# Patient Record
Sex: MALE | Race: ASIAN | NOT HISPANIC OR LATINO | Employment: STUDENT | URBAN - METROPOLITAN AREA
[De-identification: names, ages, dates, MRNs, and addresses within clinical notes are randomized per-mention and may not be internally consistent; named-entity substitution may affect disease eponyms.]

---
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
## 2022-01-04 ENCOUNTER — TELEPHONE (OUTPATIENT)
Dept: TRANSPLANT | Facility: CLINIC | Age: 22
End: 2022-01-04

## 2022-01-04 DIAGNOSIS — Z00.5 TRANSPLANT DONOR EVALUATION: Primary | ICD-10-CM

## 2022-01-04 RX ORDER — ALBUTEROL SULFATE 0.83 MG/ML
2.5 SOLUTION RESPIRATORY (INHALATION)
Status: CANCELLED | OUTPATIENT
Start: 2022-01-21

## 2022-01-04 RX ORDER — NALOXONE HYDROCHLORIDE 0.4 MG/ML
0.2 INJECTION, SOLUTION INTRAMUSCULAR; INTRAVENOUS; SUBCUTANEOUS
Status: CANCELLED | OUTPATIENT
Start: 2022-01-21

## 2022-01-04 RX ORDER — EPINEPHRINE 1 MG/ML
0.3 INJECTION, SOLUTION, CONCENTRATE INTRAVENOUS EVERY 5 MIN PRN
Status: CANCELLED | OUTPATIENT
Start: 2022-01-21

## 2022-01-04 RX ORDER — METHYLPREDNISOLONE SODIUM SUCCINATE 125 MG/2ML
125 INJECTION, POWDER, LYOPHILIZED, FOR SOLUTION INTRAMUSCULAR; INTRAVENOUS
Status: CANCELLED
Start: 2022-01-21

## 2022-01-04 RX ORDER — DIPHENHYDRAMINE HYDROCHLORIDE 50 MG/ML
50 INJECTION INTRAMUSCULAR; INTRAVENOUS
Status: CANCELLED
Start: 2022-01-21

## 2022-01-04 RX ORDER — ALBUTEROL SULFATE 90 UG/1
1-2 AEROSOL, METERED RESPIRATORY (INHALATION)
Status: CANCELLED
Start: 2022-01-21

## 2022-01-04 RX ORDER — MEPERIDINE HYDROCHLORIDE 25 MG/ML
25 INJECTION INTRAMUSCULAR; INTRAVENOUS; SUBCUTANEOUS EVERY 30 MIN PRN
Status: CANCELLED | OUTPATIENT
Start: 2022-01-21

## 2022-01-04 NOTE — TELEPHONE ENCOUNTER
Contacted Griffin Flores to introduce myself and my role, review of medical/surgical/family history and next steps.     Griffin Flores  is aware He can stop donor evaluation at any time.    Have you ever been positive for COVID 19? Feb 2021-cough, loss of taste    Have you received the COVID vaccination? yes If yes, when and what brand? Pfizer     Griffin Flores is a 21 year old female  ABO unknown that would like to learn more about donation to his father.     Concerns from medical/surgical/family history: none  WILL NEED NKR KIT AT [facility]    Reviewed any history of travel in endemic areas:   Strongyloides- [place], [place] and [place].  Trypanosoma cruzi ([place])- [place]  West Nile Virus- [place], [place], [place], [place] and [place].     Per our Phase 1 algorithm, does not meet criteria to do preliminary testing.    Reviewed preliminary lab tests.     Reviewed evaluation testing: Covid PCR, Iohexol, Lab work, CXR, EKG, Provider visits and functions, CT Angiogram.     Reviewed operations of selection committee and angio review meetings and the need for multidisciplinary input.     Reviewed NKR listing and transfer of care to [facility] team if approved. Provided [facility] with NKR website to review.     Briefly went over options if approved of NDD, SVP and FVP.     [facility] would like to proceed to evaluation on 1/21/22 if possible with Iohexol on 1/21/22 and COVID PCR prior to Iohexol.     Confirmed that [facility] reviewed Informed consent document and all questions answered.  Reviewed that they will receive [employer] to obtain electronic signature for the following: Informed consent, SRTR data, ALON for medical information, Auth for Electronic communication and will need their signed consent back before proceeding with evaluation.      Encouraged sign up for [employer] and confirmed [facility] sign up.    Verified recipient status if not NDD.    Donor timeline: TBD     Will send orders to  scheduling team to set up for evaluation testing.

## 2022-01-05 ENCOUNTER — DOCUMENTATION ONLY (OUTPATIENT)
Dept: HEALTH INFORMATION MANAGEMENT | Facility: CLINIC | Age: 22
End: 2022-01-05

## 2022-01-13 DIAGNOSIS — Z00.5 TRANSPLANT DONOR EVALUATION: Primary | ICD-10-CM

## 2022-01-20 DIAGNOSIS — Z00.5 TRANSPLANT DONOR EVALUATION: ICD-10-CM

## 2022-01-21 ENCOUNTER — ALLIED HEALTH/NURSE VISIT (OUTPATIENT)
Dept: TRANSPLANT | Facility: CLINIC | Age: 22
End: 2022-01-21
Attending: TRANSPLANT SURGERY

## 2022-01-21 ENCOUNTER — APPOINTMENT (OUTPATIENT)
Dept: TRANSPLANT | Facility: CLINIC | Age: 22
End: 2022-01-21
Attending: TRANSPLANT SURGERY

## 2022-01-21 ENCOUNTER — ANCILLARY PROCEDURE (OUTPATIENT)
Dept: CT IMAGING | Facility: CLINIC | Age: 22
End: 2022-01-21
Attending: INTERNAL MEDICINE

## 2022-01-21 ENCOUNTER — ANCILLARY PROCEDURE (OUTPATIENT)
Dept: GENERAL RADIOLOGY | Facility: CLINIC | Age: 22
End: 2022-01-21
Attending: INTERNAL MEDICINE

## 2022-01-21 ENCOUNTER — OFFICE VISIT (OUTPATIENT)
Dept: TRANSPLANT | Facility: CLINIC | Age: 22
End: 2022-01-21

## 2022-01-21 ENCOUNTER — LAB (OUTPATIENT)
Dept: LAB | Facility: CLINIC | Age: 22
End: 2022-01-21
Attending: INTERNAL MEDICINE

## 2022-01-21 ENCOUNTER — OFFICE VISIT (OUTPATIENT)
Dept: TRANSPLANT | Facility: CLINIC | Age: 22
End: 2022-01-21
Attending: SURGERY

## 2022-01-21 ENCOUNTER — OFFICE VISIT (OUTPATIENT)
Dept: INFUSION THERAPY | Facility: CLINIC | Age: 22
End: 2022-01-21
Attending: INTERNAL MEDICINE

## 2022-01-21 ENCOUNTER — OFFICE VISIT (OUTPATIENT)
Dept: NEPHROLOGY | Facility: CLINIC | Age: 22
End: 2022-01-21
Attending: TRANSPLANT SURGERY

## 2022-01-21 VITALS
OXYGEN SATURATION: 100 % | HEART RATE: 63 BPM | HEIGHT: 72 IN | DIASTOLIC BLOOD PRESSURE: 84 MMHG | SYSTOLIC BLOOD PRESSURE: 129 MMHG | WEIGHT: 201.4 LBS | BODY MASS INDEX: 27.28 KG/M2

## 2022-01-21 VITALS — HEIGHT: 72 IN | WEIGHT: 201.4 LBS | BODY MASS INDEX: 27.28 KG/M2

## 2022-01-21 DIAGNOSIS — Z00.5 TRANSPLANT DONOR EVALUATION: ICD-10-CM

## 2022-01-21 DIAGNOSIS — Z00.5 TRANSPLANT DONOR EVALUATION: Primary | ICD-10-CM

## 2022-01-21 LAB
A1 AG RBC QL: POSITIVE
ABO/RH(D): NORMAL
ABO/RH(D): NORMAL
ALBUMIN SERPL-MCNC: 4.2 G/DL (ref 3.4–5)
ALBUMIN UR-MCNC: NEGATIVE MG/DL
ALP SERPL-CCNC: 36 U/L (ref 65–260)
ALT SERPL W P-5'-P-CCNC: 44 U/L (ref 0–50)
ANION GAP SERPL CALCULATED.3IONS-SCNC: 6 MMOL/L (ref 3–14)
ANTIBODY SCREEN: NEGATIVE
ANTIBODY SCREEN: NEGATIVE
APPEARANCE UR: CLEAR
APTT PPP: 33 SECONDS (ref 22–38)
AST SERPL W P-5'-P-CCNC: 14 U/L (ref 0–35)
BILIRUB SERPL-MCNC: 0.3 MG/DL (ref 0.2–1.3)
BILIRUB UR QL STRIP: NEGATIVE
BUN SERPL-MCNC: 12 MG/DL (ref 7–30)
CALCIUM SERPL-MCNC: 9.4 MG/DL (ref 8.5–10.1)
CHLORIDE BLD-SCNC: 106 MMOL/L (ref 98–110)
CHOLEST SERPL-MCNC: 170 MG/DL
CMV IGG SERPL IA-ACNC: >10 U/ML
CMV IGG SERPL IA-ACNC: ABNORMAL
CO2 SERPL-SCNC: 30 MMOL/L (ref 20–32)
COLOR UR AUTO: YELLOW
CREAT SERPL-MCNC: 1.03 MG/DL (ref 0.5–1)
CREAT UR-MCNC: 171 MG/DL
CREAT UR-MCNC: 171 MG/DL
EBV VCA IGG SER IA-ACNC: 404 U/ML
EBV VCA IGG SER IA-ACNC: POSITIVE
EBV VCA IGM SER IA-ACNC: <10 U/ML
EBV VCA IGM SER IA-ACNC: NORMAL
ERYTHROCYTE [DISTWIDTH] IN BLOOD BY AUTOMATED COUNT: 16.8 % (ref 10–15)
FASTING STATUS PATIENT QL REPORTED: YES
GFR SERPL CREATININE-BSD FRML MDRD: >90 ML/MIN/1.73M2
GLUCOSE BLD-MCNC: 90 MG/DL (ref 70–99)
GLUCOSE UR STRIP-MCNC: NEGATIVE MG/DL
HBA1C MFR BLD: 5.8 % (ref 0–5.6)
HBV CORE AB SERPL QL IA: NONREACTIVE
HBV SURFACE AB SERPL IA-ACNC: 1.03 M[IU]/ML
HBV SURFACE AG SERPL QL IA: NONREACTIVE
HCT VFR BLD AUTO: 49.1 % (ref 40–53)
HCV AB SERPL QL IA: NONREACTIVE
HDLC SERPL-MCNC: 60 MG/DL
HGB BLD-MCNC: 14.6 G/DL (ref 13.3–17.7)
HGB UR QL STRIP: NEGATIVE
HIV 1+2 AB+HIV1 P24 AG SERPL QL IA: NONREACTIVE
INR PPP: 1.02 (ref 0.85–1.15)
KETONES UR STRIP-MCNC: NEGATIVE MG/DL
LDLC SERPL CALC-MCNC: 82 MG/DL
LEUKOCYTE ESTERASE UR QL STRIP: NEGATIVE
MCH RBC QN AUTO: 20.3 PG (ref 26.5–33)
MCHC RBC AUTO-ENTMCNC: 29.7 G/DL (ref 31.5–36.5)
MCV RBC AUTO: 68 FL (ref 78–100)
MICROALBUMIN UR-MCNC: 8 MG/L
MICROALBUMIN/CREAT UR: 4.68 MG/G CR (ref 0–17)
NITRATE UR QL: NEGATIVE
NONHDLC SERPL-MCNC: 110 MG/DL
PH UR STRIP: 6 [PH] (ref 5–7)
PHOSPHATE SERPL-MCNC: 4.4 MG/DL (ref 2.5–4.5)
PLATELET # BLD AUTO: 203 10E3/UL (ref 150–450)
POTASSIUM BLD-SCNC: 4 MMOL/L (ref 3.4–5.3)
PROT SERPL-MCNC: 7.9 G/DL (ref 6.8–8.8)
PROT UR-MCNC: 0.13 G/L
PROT/CREAT 24H UR: 0.08 G/G CR (ref 0–0.2)
RBC # BLD AUTO: 7.2 10E6/UL (ref 4.4–5.9)
RBC URINE: 1 /HPF
SODIUM SERPL-SCNC: 142 MMOL/L (ref 133–144)
SP GR UR STRIP: 1.02 (ref 1–1.03)
SPECIMEN EXPIRATION DATE: NORMAL
SPECIMEN EXPIRATION DATE: NORMAL
T PALLIDUM AB SER QL: NONREACTIVE
TRIGL SERPL-MCNC: 138 MG/DL
URATE SERPL-MCNC: 6.9 MG/DL (ref 3.5–7.2)
UROBILINOGEN UR STRIP-MCNC: NORMAL MG/DL
WBC # BLD AUTO: 5.1 10E3/UL (ref 4–11)
WBC URINE: <1 /HPF

## 2022-01-21 PROCEDURE — 80053 COMPREHEN METABOLIC PANEL: CPT

## 2022-01-21 PROCEDURE — 86682 HELMINTH ANTIBODY: CPT

## 2022-01-21 PROCEDURE — 83036 HEMOGLOBIN GLYCOSYLATED A1C: CPT

## 2022-01-21 PROCEDURE — 85610 PROTHROMBIN TIME: CPT

## 2022-01-21 PROCEDURE — 99205 OFFICE O/P NEW HI 60 MIN: CPT

## 2022-01-21 PROCEDURE — 86803 HEPATITIS C AB TEST: CPT | Performed by: INTERNAL MEDICINE

## 2022-01-21 PROCEDURE — 87340 HEPATITIS B SURFACE AG IA: CPT | Performed by: INTERNAL MEDICINE

## 2022-01-21 PROCEDURE — 71046 X-RAY EXAM CHEST 2 VIEWS: CPT | Mod: GC | Performed by: RADIOLOGY

## 2022-01-21 PROCEDURE — 86481 TB AG RESPONSE T-CELL SUSP: CPT | Performed by: INTERNAL MEDICINE

## 2022-01-21 PROCEDURE — 85730 THROMBOPLASTIN TIME PARTIAL: CPT

## 2022-01-21 PROCEDURE — 250N000011 HC RX IP 250 OP 636: Performed by: INTERNAL MEDICINE

## 2022-01-21 PROCEDURE — 86901 BLOOD TYPING SEROLOGIC RH(D): CPT | Performed by: INTERNAL MEDICINE

## 2022-01-21 PROCEDURE — 86780 TREPONEMA PALLIDUM: CPT | Performed by: INTERNAL MEDICINE

## 2022-01-21 PROCEDURE — 86905 BLOOD TYPING RBC ANTIGENS: CPT | Performed by: INTERNAL MEDICINE

## 2022-01-21 PROCEDURE — 80061 LIPID PANEL: CPT

## 2022-01-21 PROCEDURE — 86644 CMV ANTIBODY: CPT | Performed by: INTERNAL MEDICINE

## 2022-01-21 PROCEDURE — 74175 CTA ABDOMEN W/CONTRAST: CPT | Mod: GC | Performed by: RADIOLOGY

## 2022-01-21 PROCEDURE — 85027 COMPLETE CBC AUTOMATED: CPT

## 2022-01-21 PROCEDURE — 84550 ASSAY OF BLOOD/URIC ACID: CPT

## 2022-01-21 PROCEDURE — 86704 HEP B CORE ANTIBODY TOTAL: CPT | Performed by: INTERNAL MEDICINE

## 2022-01-21 PROCEDURE — 82040 ASSAY OF SERUM ALBUMIN: CPT

## 2022-01-21 PROCEDURE — 84156 ASSAY OF PROTEIN URINE: CPT

## 2022-01-21 PROCEDURE — 84100 ASSAY OF PHOSPHORUS: CPT

## 2022-01-21 PROCEDURE — 86706 HEP B SURFACE ANTIBODY: CPT | Performed by: INTERNAL MEDICINE

## 2022-01-21 PROCEDURE — 86665 EPSTEIN-BARR CAPSID VCA: CPT | Performed by: INTERNAL MEDICINE

## 2022-01-21 PROCEDURE — 36415 COLL VENOUS BLD VENIPUNCTURE: CPT | Performed by: INTERNAL MEDICINE

## 2022-01-21 PROCEDURE — 99203 OFFICE O/P NEW LOW 30 MIN: CPT | Performed by: SURGERY

## 2022-01-21 PROCEDURE — 36415 COLL VENOUS BLD VENIPUNCTURE: CPT

## 2022-01-21 PROCEDURE — 86789 WEST NILE VIRUS ANTIBODY: CPT

## 2022-01-21 PROCEDURE — 81001 URINALYSIS AUTO W/SCOPE: CPT

## 2022-01-21 PROCEDURE — 82043 UR ALBUMIN QUANTITATIVE: CPT

## 2022-01-21 PROCEDURE — 86753 PROTOZOA ANTIBODY NOS: CPT | Performed by: INTERNAL MEDICINE

## 2022-01-21 PROCEDURE — 86850 RBC ANTIBODY SCREEN: CPT | Performed by: INTERNAL MEDICINE

## 2022-01-21 RX ORDER — ALBUTEROL SULFATE 90 UG/1
1-2 AEROSOL, METERED RESPIRATORY (INHALATION)
Status: CANCELLED
Start: 2022-01-21

## 2022-01-21 RX ORDER — MEPERIDINE HYDROCHLORIDE 25 MG/ML
25 INJECTION INTRAMUSCULAR; INTRAVENOUS; SUBCUTANEOUS EVERY 30 MIN PRN
Status: CANCELLED | OUTPATIENT
Start: 2022-01-21

## 2022-01-21 RX ORDER — ALBUTEROL SULFATE 0.83 MG/ML
2.5 SOLUTION RESPIRATORY (INHALATION)
Status: CANCELLED | OUTPATIENT
Start: 2022-01-21

## 2022-01-21 RX ORDER — NALOXONE HYDROCHLORIDE 0.4 MG/ML
0.2 INJECTION, SOLUTION INTRAMUSCULAR; INTRAVENOUS; SUBCUTANEOUS
Status: CANCELLED | OUTPATIENT
Start: 2022-01-21

## 2022-01-21 RX ORDER — EPINEPHRINE 1 MG/ML
0.3 INJECTION, SOLUTION INTRAMUSCULAR; SUBCUTANEOUS EVERY 5 MIN PRN
Status: CANCELLED | OUTPATIENT
Start: 2022-01-21

## 2022-01-21 RX ORDER — METHYLPREDNISOLONE SODIUM SUCCINATE 125 MG/2ML
125 INJECTION, POWDER, LYOPHILIZED, FOR SOLUTION INTRAMUSCULAR; INTRAVENOUS
Status: CANCELLED
Start: 2022-01-21

## 2022-01-21 RX ORDER — DIPHENHYDRAMINE HYDROCHLORIDE 50 MG/ML
50 INJECTION INTRAMUSCULAR; INTRAVENOUS
Status: CANCELLED
Start: 2022-01-21

## 2022-01-21 RX ORDER — IOPAMIDOL 755 MG/ML
100 INJECTION, SOLUTION INTRAVASCULAR ONCE
Status: COMPLETED | OUTPATIENT
Start: 2022-01-21 | End: 2022-01-21

## 2022-01-21 RX ADMIN — IOPAMIDOL 100 ML: 755 INJECTION, SOLUTION INTRAVASCULAR at 13:56

## 2022-01-21 RX ADMIN — IOHEXOL 5 ML: 300 INJECTION, SOLUTION INTRAVENOUS at 08:36

## 2022-01-21 ASSESSMENT — MIFFLIN-ST. JEOR
SCORE: 1966.54
SCORE: 1966.67

## 2022-01-21 NOTE — NURSING NOTE
"Chief Complaint   Patient presents with     Consult     Kidney donor     Blood pressure 121/76, pulse 63, height 1.829 m (6' 0.01\"), weight 91.4 kg (201 lb 6.4 oz), SpO2 100 %.    Dawn Acevedo on 1/21/2022 at 9:11 AM    "

## 2022-01-21 NOTE — LETTER
1/21/2022       RE: Griffin Flores  [address] [place] [id number]     Dear Colleague,    Thank you for referring your patient, Griffin Flores, to the [facility]PHR[facility] [place] at [facility]. Please see a copy of my visit note below.    TRANSPLANT NEPHROLOGY DONOR EVALUATION    Assessment and Plan:  # Prospective Kidney Transplant Donor: Patient with one issue that needs to be addressed prior to donation. Patient's blood pressure is acceptable at this visit, kidney function appears to be acceptable with Iohexol pending, and urinalysis is bland.    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     Discussed the risks of donating a kidney, including the surgical risk and the possible risks of living with one kidney.    Education about expected post-donation kidney function and how chronic kidney disease (CKD) and end stage kidney disease (ESKD) might potentially impact the donor in the future, include, but not limited to:       - On average, donors will have 25-35% permanent loss of kidney function at donation.       - Baseline risk of ESKD may slightly exceed that of members of the general population with the same demographic profile.       - Donor risks must be interpreted in light of known epidemiology of both CKD or ESKD, such as that CKD generally develops in midlife (40-50 years old) and ESKD generally develops after age 60.       - Medical evaluation of young potential donors cannot predict lifetime risk of CKD or ESKD.       - Donors may be at higher risk for CKD if they sustain damage to the remaining kidney.       - Development of CKD and progression of ESKD may be more rapid with only 1 kidney.       - Some type of kidney replacement therapy, either  kidney transplant or dialysis, is required when reaching ESKD.    Potential medical or surgical risks include, but not limited to:       - Death.       - Scars, pain, fatigue, and other consequences typical of any surgical procedure.       - Decreased kidney function.       - Abdominal or bowel symptoms, such as bloating and nausea, and developing bowel obstruction.       - Kidney failure (ESKD) and the need for a kidney transplant or dialysis for the donor.       - Impact of obesity, hypertension, or other donor-specific medical conditions on morbidity and mortality of the potential donor.    Patients overall evaluation will be discussed with the transplant team and a final recommendation on the patients' suitability to be a kidney transplant donor will be made at that time.    Consult:  Griffin Flores was seen in consultation at the request of Dr. Elvira Slaughter for evaluation as a potential kidney transplant donor.    Reason for Visit:  Griffin Flores is a 21 year old male who presents for a kidney donor evaluation.  Patient would like to donate to father .    Present Condition and Donor-Related Medical History:      Griffin is a delightful 21 year old young man, who is interested in donating a kidney to his father. His father primary disease is DM. 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 He is generally healthy. Lately he has not been watching his diet as much as he would like to. 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          Kidney Disease Hx:       h/o Kidney Problems: No  Family h/o Genetic Kidney Disease: No       h/o Hypertension: No    Usual Blood Pressure: wnl       h/o Protein in Urine: No  h/o Blood in Urine: No       h/o Kidney Stones: No  h/o Kidney Injury: No       h/o Recurrent UTI: No  h/o Genitourinary Problems: No       h/o Chronic NSAID Use: No         Other Medical Hx:       h/o Diabetes: No             h/o  Gastrointestinal, Pancreas or Liver Problems: No       h/o Lung or Heart Problems: No       h/o Hematologic Problems: No  h/o Bleeding or Clotting Problems: No       h/o Cancer: No       h/o Infection Problems: No              Skin Cancer Risk:       h/o more than 50 moles: No       h/o extensive sun exposure: No       h/o melanoma: No       Family h/o melanoma: No         Mental Health Assessment:       h/o Depression: No       h/o Psychiatric Illness: No       h/o Suicidal Attempt(s): No    COVID Status:  Vaccination Up To Date: Yes  H/o COVID Infection: Yes     Review Of Systems:   A comprehensive review of systems was obtained and negative, except as noted in the HPI or PMH.    Past Medical History:   History was taken from the patient as noted below.  Past Medical History:   Diagnosis Date     Known health problems: none        Past Social History:   Past Surgical History:   Procedure Laterality Date     NO HISTORY OF SURGERY       Personal or family history of anesthesia problems: No    Family History:   Family History   Problem Relation Age of Onset     No Known Problems Mother      Diabetes Father      Kidney failure Father      No Known Problems Sister      No Known Problems Sister      No Known Problems Sister      Unknown/Adopted Maternal Grandmother      Unknown/Adopted Maternal Grandfather      Diabetes Paternal Grandmother      Unknown/Adopted Paternal Grandfather           Specific Family History in First Degree Relatives:       FH of Kidney Dz: Yes  FH of Diabetes: Yes        FH of Hypertension: Yes   FH of CAD: No       FH of Cancer: No  FH of Kidney Cancer: No    Personal History:   Social History     Socioeconomic History     Marital status: Single     Spouse name: Not on file     Number of children: Not on file     Years of education: Not on file     Highest education level: Not on file   Occupational History     Not on file   Tobacco Use     Smoking status: Never Smoker     Smokeless tobacco:  "Never Used   Substance and Sexual Activity     Alcohol use: Never     Drug use: Never     Sexual activity: Not on file   Other Topics Concern     Not on file   Social History Narrative     Not on file     Social Determinants of Health     Financial Resource Strain: Not on file   Food Insecurity: Not on file   Transportation Needs: Not on file   Physical Activity: Not on file   Stress: Not on file   Social Connections: Not on file   Intimate Partner Violence: Not on file   Housing Stability: Not on file          Specific Social History:       Health Insurance Status: No       Employment Status: Full time  Occupation: Factory Job                       Living Arrangements: lives in an adult home       Social Support: Yes       Presence of increased risk for disease transmission behaviors as defined by PHS guidelines: No        Allergies:  No Known Allergies    Medications:  No current outpatient medications on file.     No current facility-administered medications for this visit.     There are no discontinued medications.      Vitals:  Vital Signs 1/21/2022 1/21/2022 1/21/2022   Systolic 121 121 129   Diastolic 74 76 84   Pulse 63 - -   Weight (LB) 201 lb 6.4 oz - -   Height 6' 0.008\" - -   BMI (Calculated) 27.31 - -   O2 100 - -       Exam:   GENERAL APPEARANCE: alert and no distress  HENT: mouth without ulcers or lesions  LYMPHATICS: no cervical or supraclavicular nodes  RESP: lungs clear to auscultation - no rales, rhonchi or wheezes  CV: regular rhythm, normal rate, no rub, no murmur  EDEMA: no LE edema bilaterally  ABDOMEN: soft, nondistended, nontender, bowel sounds normal  MS: extremities normal - no gross deformities noted, no evidence of inflammation in joints, no muscle tenderness  SKIN: no rash    Results:   Labs and imaging were ordered for this visit and reviewed by me.  Recent Results (from the past 336 hour(s))   CBC with platelets    Collection Time: 01/21/22  8:10 AM   Result Value Ref Range    WBC " Count 5.1 4.0 - 11.0 10e3/uL    RBC Count 7.20 (H) 4.40 - 5.90 10e6/uL    Hemoglobin 14.6 13.3 - 17.7 g/dL    Hematocrit 49.1 40.0 - 53.0 %    MCV 68 (L) 78 - 100 fL    MCH 20.3 (L) 26.5 - 33.0 pg    MCHC 29.7 (L) 31.5 - 36.5 g/dL    RDW 16.8 (H) 10.0 - 15.0 %    Platelet Count 203 150 - 450 10e3/uL   Partial thromboplastin time    Collection Time: 01/21/22  8:10 AM   Result Value Ref Range    aPTT 33 22 - 38 Seconds   INR    Collection Time: 01/21/22  8:10 AM   Result Value Ref Range    INR 1.02 0.85 - 1.15   Hemoglobin A1c    Collection Time: 01/21/22  8:10 AM   Result Value Ref Range    Hemoglobin A1C 5.8 (H) 0.0 - 5.6 %   Phosphorus    Collection Time: 01/21/22  8:10 AM   Result Value Ref Range    Phosphorus 4.4 2.5 - 4.5 mg/dL   Uric acid    Collection Time: 01/21/22  8:10 AM   Result Value Ref Range    Uric Acid 6.9 3.5 - 7.2 mg/dL   Lipid Profile    Collection Time: 01/21/22  8:10 AM   Result Value Ref Range    Cholesterol 170 (H) <170 mg/dL    Triglycerides 138 (H) <90 mg/dL    Direct Measure HDL 60 >=40 mg/dL    LDL Cholesterol Calculated 82 <=110 mg/dL    Non HDL Cholesterol 110 <120 mg/dL    Patient Fasting > 8hrs? Yes    Comprehensive metabolic panel    Collection Time: 01/21/22  8:10 AM   Result Value Ref Range    Sodium 142 133 - 144 mmol/L    Potassium 4.0 3.4 - 5.3 mmol/L    Chloride 106 98 - 110 mmol/L    Carbon Dioxide (CO2) 30 20 - 32 mmol/L    Anion Gap 6 3 - 14 mmol/L    Urea Nitrogen 12 7 - 30 mg/dL    Creatinine 1.03 (H) 0.50 - 1.00 mg/dL    Calcium 9.4 8.5 - 10.1 mg/dL    Glucose 90 70 - 99 mg/dL    Alkaline Phosphatase 36 (L) 65 - 260 U/L    AST 14 0 - 35 U/L    ALT 44 0 - 50 U/L    Protein Total 7.9 6.8 - 8.8 g/dL    Albumin 4.2 3.4 - 5.0 g/dL    Bilirubin Total 0.3 0.2 - 1.3 mg/dL    GFR Estimate >90 >60 mL/min/1.73m2               Again, thank you for allowing me to participate in the care of your patient.      Sincerely,     Kidney Donor Jayjay

## 2022-01-21 NOTE — LETTER
"    1/21/2022         RE: Griffin Flores  [address] [id number]        Dear Colleague,    Thank you for referring your patient, Griffin Flores, to the [facility] TRANSPLANT [facility]. Please see a copy of my visit note below.    Living Kidney Donor Consent per OPTN Policy 14.2 for Independent Living Donor Advocate (ROSEMARY)    Organ Type: Related Kidney Donor  Presenting Information:  Griffin \"Armando\" Sandra presents to [facility] to complete a living donor evaluation since he is interested in becoming a kidney donor for his father, Ash Aguiar.  He was neatly dressed and groomed.  He came to the transplant center alone today.  He was pleasant and forthcoming in sharing information.      Written assurance has been obtained from the potential donor that he/she:   Is willing to donate  Is free from inducement and coercion  Has been informed that the he/she may decline to donate at any time  Has been informed that transplant centers must:   A) Offer donors an opportunity to discontinue the donor consent or evaluation process in a way that is protected and confidential  B) Provide an independent living donor advocate (ROSEMARY) to assist the potential donor during this process    The following was presented to the potential donor in a language in which the potential donor is able to engage in meaningful dialogue:   Education and instruction about all phases of the living donation process including:   Consent  Medical and psychosocial evaluation  Information about the surgical procedure  Pre and post operative care  Benefits of post operative follow up  Disclosure that the recovery hospital will take all reasonable precautions to provide confidentiality for the donor/recipient  Disclosure that it is a federal crime for any person to knowingly acquire, obtain or otherwise transfer any human organ for valuable " consideration  Disclosure that the [facility] must provide an independent living donor advocate (ROSEMARY)  Disclosure that health information obtained during the evaluation is subject to the same regulations as all records and could reveal conditions that must be reported to local, state, or federal public health authorities  Disclosure that the [facility] is required to report living donor follow up information at 6 months, 1 year, and 2 years, and that the potential donor must commit to post operative follow up testing coordinated by the [facility]    Disclosure has been provided that these risks may be transient or permanent & include but are not limited to:  Potential psychosocial risks:  Problems with body image  Post-surgery depression or anxiety  Feelings of emotional distress or bereavement if recipient experiences any recurrent disease or in the event of the recipient s death  Impact of donation on the donor s lifestyle, such as limited ability to exercise in the short term post operative recovery period, no driving for the first 2 weeks post op or until the donor is no longer needing pain medications that impair the ability to drive.      Potential financial impacts:  Personal expenses of travel, housing, , lost wages related to donation might not be reimbursed. However, resources may be available to defray some donation-related expenses.   Need for life-long follow up at the donor s expense  Loss of employment or income  Negative impact on the ability to obtain future employment  Negative impact on the ability to obtain, maintain, or afford health, disability, and life insurance  Future health problems experienced by living donors following donation may not be covered by the recipient s insurance      PREPARATION FOR DONATION, RECOVERY, AND POTENTIAL SHORT-LONG-TERM OUTCOMES:  Understanding of the Living Donation Process:  We discussed the role of Independent Living Donor  Advocate.  Short and long term medical and psychosocial risks to both, donor and recipient were reviewed and he is capable of understanding the risks.  Post surgical restrictions (2 weeks no driving, 6 weeks no lifting over 10 lbs) were reviewed and he appears capable of adhering to the post surgical requirements. The need for a caregiver was discussed .  The risk of poor psychosocial outcome including problems with body image, post-surgery depression or anxiety, or feelings of emotional distress or bereavement if recipient experiences any recurrent disease, poor outcome or death was reviewed.  Additionally, potential financial implications, including the risk of having difficulty obtaining health care insurance, life insurance, disability insurance, or long term care insurance were reviewed, as were available donor grants to assist with donor related expenses.      Impressions/Recommendations:  Griffin Flores appears highly motivated to donate a kidney to his father.  He has taken this semester off of college to transport his father to/from dialysis and to hopefully be able to be his donor.  He is starting a new job which requires heavy lifting, so the lifting restrictions were somewhat concerning for him.  He is aware that we are able to reimburse up to six weeks of lost wages but he may likely be off of work longer due to lifting.  He is aware that he can forgo donation.  He appears capable of understanding this information and making an informed medical decision.  As ROSEMARY, no contraindications were identified today.  He has my contact information and is aware that ROSEMARY is available throughout the donation process.    Contact Information:  MARIE ALMARAZ, [facility]   Independent Donor Advocate  [facility]  Phone - [phone]  Pager - [phone]  [email or web address]      Time Spent: 30 minutes        Again, thank you for allowing me to participate in the care of your patient.        Sincerely,        Juani DIAZ  Guerrero, HANNAHSW

## 2022-01-21 NOTE — LETTER
Date:January 25, 2022      Provider requested that no letter be sent. Do not send.       [facility]

## 2022-01-21 NOTE — LETTER
Date:January 28, 2022      Patient was self referred, no letter generated. Do not send.        [facility] Information

## 2022-01-21 NOTE — NURSING NOTE
Chief Complaint   Patient presents with     Blood Draw     Labs drawn via PIV by RN in lab.          Labs drawn from PIV placed by RN. Line flushed with saline. Pt checked in for appointment(s).      Karolina Stock RN

## 2022-01-21 NOTE — LETTER
1/21/2022         RE: Griffin Flores  [address] [place] [id number]        Dear Colleague,    Thank you for referring your patient, Griffin Flores, to the [facility]. Please see a copy of my visit note below.    Iohexol Timed Test Nursing Note    Griffin Flores comes to [facility] today for a Iohexol GFR Timed test.   Orders from Dr. Canchola were completed.    Progress Note    The following information was verified with the patient:  *Female patients are not pregnant or could not have become recently pregnant: YES, No, Not applicable  *Is there a history of allergy (skin rash, swelling, ect) to :   a. Iodine (except skin reactions to Betadine): NO   b. Intravenous radio-contrast agents: NO   c. Seafood: NO     RN provided patient with educational handout regarding timed test. YES    Medication administered :   Iohexol (Omnipaque 300 mg Iodine/ ml concentration) 5 mls.  Site administered Left AC  Start [id number]  Stop [id number]    Blood draws were taken by tx coordinator.      Patient tolerated the procedure well      Discharge Plan    Discharge instructions reviewed with patient: YES  Discharge papers printed and given to patient: YES  Patient/Representative verbalized understanding, all questions answered: YES      Ruth Cuevas, JOSE

## 2022-01-21 NOTE — PROGRESS NOTES
"Living Kidney Donor Consent per OPTN Policy 14.2 for Independent Living Donor Advocate (ROSEMARY)    Organ Type: Related Kidney Donor  Presenting Information:  Anoulith \"Armando\" Sandra presents to [facility], [facility] Trans[facility] to complete a living donor evaluation since he is interested in becoming a kidney donor for his father, Ash Aguiar.  He was neatly dressed and groomed.  He came to the transplant center alone today.  He was pleasant and forthcoming in sharing information.      Written assurance has been obtained from the potential donor that he/she:   Is willing to donate  Is free from inducement and coercion  Has been informed that the he/she may decline to donate at any time  Has been informed that transplant centers must:   A) Offer donors an opportunity to discontinue the donor consent or evaluation process in a way that is protected and confidential  B) Provide an independent living donor advocate (ROSEMARY) to assist the potential donor during this process    The following was presented to the potential donor in a language in which the potential donor is able to engage in meaningful dialogue:   Education and instruction about all phases of the living donation process including:   Consent  Medical and psychosocial evaluation  Information about the surgical procedure  Pre and post operative care  Benefits of post operative follow up  Disclosure that the recovery hospital will take all reasonable precautions to provide confidentiality for the donor/recipient  Disclosure that it is a federal crime for any person to knowingly acquire, obtain or otherwise transfer any human organ for valuable consideration  Disclosure that the recovery hospital must provide an independent living donor advocate (ROSEMARY)  Disclosure that health information obtained during the evaluation is subject to the same regulations as all records and could reveal conditions that must be " reported to local, state, or federal public health authorities  Disclosure that the [facility] is required to report living donor follow up information at 6 months, 1 year, and 2 years, and that the potential donor must commit to post operative follow up testing coordinated by the [facility]    Disclosure has been provided that these risks may be transient or permanent & include but are not limited to:  Potential psychosocial risks:  Problems with body image  Post-surgery depression or anxiety  Feelings of emotional distress or bereavement if recipient experiences any recurrent disease or in the event of the recipient s death  Impact of donation on the donor s lifestyle, such as limited ability to exercise in the short term post operative recovery period, no driving for the first 2 weeks post op or until the donor is no longer needing pain medications that impair the ability to drive.      Potential financial impacts:  Personal expenses of travel, housing, , lost wages related to donation might not be reimbursed. However, resources may be available to defray some donation-related expenses.   Need for life-long follow up at the donor s expense  Loss of employment or income  Negative impact on the ability to obtain future employment  Negative impact on the ability to obtain, maintain, or afford health, disability, and life insurance  Future health problems experienced by living donors following donation may not be covered by the recipient s insurance      PREPARATION FOR DONATION, RECOVERY, AND POTENTIAL SHORT-LONG-TERM OUTCOMES:  Understanding of the Living Donation Process:  We discussed the role of Independent Living Donor Advocate.  Short and long term medical and psychosocial risks to both, donor and recipient were reviewed and he is capable of understanding the risks.  Post surgical restrictions (2 weeks no driving, 6 weeks no lifting over 10 lbs) were reviewed and he appears capable of  adhering to the post surgical requirements. The need for a caregiver was discussed .  The risk of poor psychosocial outcome including problems with body image, post-surgery depression or anxiety, or feelings of emotional distress or bereavement if recipient experiences any recurrent disease, poor outcome or death was reviewed.  Additionally, potential financial implications, including the risk of having difficulty obtaining health care insurance, life insurance, disability insurance, or long term care insurance were reviewed, as were available donor grants to assist with donor related expenses.      Impressions/Recommendations:  Griffin Flores appears highly motivated to donate a kidney to his father.  He has taken this semester off of college to transport his father to/from dialysis and to hopefully be able to be his donor.  He is starting a new job which requires heavy lifting, so the lifting restrictions were somewhat concerning for him.  He is aware that we are able to reimburse up to six weeks of lost wages but he may likely be off of work longer due to lifting.  He is aware that he can forgo donation.  He appears capable of understanding this information and making an informed medical decision.  As ROSEMARY, no contraindications were identified today.  He has my contact information and is aware that ROSEMARY is available throughout the donation process.    Contact Information:  MARIE ALMARAZ, [facility]   Independent Donor Advocate  [employer]  Phone - [phone]  Pager - [phone]  [email or web address]      Time Spent: 30 minutes

## 2022-01-21 NOTE — PROGRESS NOTES
TRANSPLANT NEPHROLOGY DONOR EVALUATION    Assessment and Plan:  # Prospective Kidney Transplant Donor: Patient with one issue that needs to be addressed prior to donation. Patient's blood pressure is acceptable at this visit, kidney function appears to be acceptable with Iohexol pending, and urinalysis is bland.    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     Discussed the risks of donating a kidney, including the surgical risk and the possible risks of living with one kidney.    Education about expected post-donation kidney function and how chronic kidney disease (CKD) and end stage kidney disease (ESKD) might potentially impact the donor in the future, include, but not limited to:       - On average, donors will have 25-35% permanent loss of kidney function at donation.       - Baseline risk of ESKD may slightly exceed that of members of the general population with the same demographic profile.       - Donor risks must be interpreted in light of known epidemiology of both CKD or ESKD, such as that CKD generally develops in midlife (40-50 years old) and ESKD generally develops after age 60.       - Medical evaluation of young potential donors cannot predict lifetime risk of CKD or ESKD.       - Donors may be at higher risk for CKD if they sustain damage to the remaining kidney.       - Development of CKD and progression of ESKD may be more rapid with only 1 kidney.       - Some type of kidney replacement therapy, either kidney transplant or dialysis, is required when reaching ESKD.    Potential medical or surgical risks include, but not limited to:       - Death.       - Scars, pain, fatigue, and other consequences typical of any surgical procedure.       - Decreased kidney function.       - Abdominal or bowel symptoms, such as  bloating and nausea, and developing bowel obstruction.       - Kidney failure (ESKD) and the need for a kidney transplant or dialysis for the donor.       - Impact of obesity, hypertension, or other donor-specific medical conditions on morbidity and mortality of the potential donor.    Patients overall evaluation will be discussed with the transplant team and a final recommendation on the patients' suitability to be a kidney transplant donor will be made at that time.    Consult:  Griffin Flores was seen in consultation at the request of Dr. Elvira Slaughter for evaluation as a potential kidney transplant donor.    Reason for Visit:  Griffin Flores is a 21 year old male who presents for a kidney donor evaluation.  Patient would like to donate to father .    Present Condition and Donor-Related Medical History:      Griffin is a delightful 21 year old young man, who is interested in donating a kidney to his father. His father primary disease is DM. 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 He is generally healthy. Lately he has not been watching his diet as much as he would like to. 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          Kidney Disease Hx:       h/o Kidney Problems: No  Family h/o Genetic Kidney Disease: No       h/o Hypertension: No    Usual Blood Pressure: wnl       h/o Protein in Urine: No  h/o Blood in Urine: No       h/o Kidney Stones: No  h/o Kidney Injury: No       h/o Recurrent UTI: No  h/o Genitourinary Problems: No       h/o Chronic NSAID Use: No         Other Medical Hx:       h/o Diabetes: No             h/o Gastrointestinal, Pancreas or Liver Problems: No       h/o Lung or Heart Problems: No       h/o Hematologic Problems: No  h/o Bleeding or Clotting Problems: No       h/o Cancer: No       h/o Infection Problems: No              Skin Cancer Risk:       h/o more than 50 moles: No       h/o extensive sun exposure: No       h/o  melanoma: No       Family h/o melanoma: No         Mental Health Assessment:       h/o Depression: No       h/o Psychiatric Illness: No       h/o Suicidal Attempt(s): No    COVID Status:  Vaccination Up To Date: Yes  H/o COVID Infection: Yes     Review Of Systems:   A comprehensive review of systems was obtained and negative, except as noted in the HPI or PMH.    Past Medical History:   History was taken from the patient as noted below.  Past Medical History:   Diagnosis Date     Known health problems: none        Past Social History:   Past Surgical History:   Procedure Laterality Date     NO HISTORY OF SURGERY       Personal or family history of anesthesia problems: No    Family History:   Family History   Problem Relation Age of Onset     No Known Problems Mother      Diabetes Father      Kidney failure Father      No Known Problems Sister      No Known Problems Sister      No Known Problems Sister      Unknown/Adopted Maternal Grandmother      Unknown/Adopted Maternal Grandfather      Diabetes Paternal Grandmother      Unknown/Adopted Paternal Grandfather           Specific Family History in First Degree Relatives:       FH of Kidney Dz: Yes  FH of Diabetes: Yes        FH of Hypertension: Yes   FH of CAD: No       FH of Cancer: No  FH of Kidney Cancer: No    Personal History:   Social History     Socioeconomic History     Marital status: Single     Spouse name: Not on file     Number of children: Not on file     Years of education: Not on file     Highest education level: Not on file   Occupational History     Not on file   Tobacco Use     Smoking status: Never Smoker     Smokeless tobacco: Never Used   Substance and Sexual Activity     Alcohol use: Never     Drug use: Never     Sexual activity: Not on file   Other Topics Concern     Not on file   Social History Narrative     Not on file     Social Determinants of Health     Financial Resource Strain: Not on file   Food Insecurity: Not on file   Transportation  "Needs: Not on file   Physical Activity: Not on file   Stress: Not on file   Social Connections: Not on file   Intimate Partner Violence: Not on file   Housing Stability: Not on file          Specific Social History:       Health Insurance Status: No       Employment Status: Full time  Occupation: Factory Job                       Living Arrangements: lives in an adult home       Social Support: Yes       Presence of increased risk for disease transmission behaviors as defined by PHS guidelines: No        Allergies:  No Known Allergies    Medications:  No current outpatient medications on file.     No current facility-administered medications for this visit.     There are no discontinued medications.      Vitals:  Vital Signs 1/21/2022 1/21/2022 1/21/2022   Systolic 121 121 129   Diastolic 74 76 84   Pulse 63 - -   Weight (LB) 201 lb 6.4 oz - -   Height 6' 0.008\" - -   BMI (Calculated) 27.31 - -   O2 100 - -       Exam:   GENERAL APPEARANCE: alert and no distress  HENT: mouth without ulcers or lesions  LYMPHATICS: no cervical or supraclavicular nodes  RESP: lungs clear to auscultation - no rales, rhonchi or wheezes  CV: regular rhythm, normal rate, no rub, no murmur  EDEMA: no LE edema bilaterally  ABDOMEN: soft, nondistended, nontender, bowel sounds normal  MS: extremities normal - no gross deformities noted, no evidence of inflammation in joints, no muscle tenderness  SKIN: no rash    Results:   Labs and imaging were ordered for this visit and reviewed by me.  Recent Results (from the past 336 hour(s))   CBC with platelets    Collection Time: 01/21/22  8:10 AM   Result Value Ref Range    WBC Count 5.1 4.0 - 11.0 10e3/uL    RBC Count 7.20 (H) 4.40 - 5.90 10e6/uL    Hemoglobin 14.6 13.3 - 17.7 g/dL    Hematocrit 49.1 40.0 - 53.0 %    MCV 68 (L) 78 - 100 fL    MCH 20.3 (L) 26.5 - 33.0 pg    MCHC 29.7 (L) 31.5 - 36.5 g/dL    RDW 16.8 (H) 10.0 - 15.0 %    Platelet Count 203 150 - 450 10e3/uL   Partial thromboplastin " time    Collection Time: 01/21/22  8:10 AM   Result Value Ref Range    aPTT 33 22 - 38 Seconds   INR    Collection Time: 01/21/22  8:10 AM   Result Value Ref Range    INR 1.02 0.85 - 1.15   Hemoglobin A1c    Collection Time: 01/21/22  8:10 AM   Result Value Ref Range    Hemoglobin A1C 5.8 (H) 0.0 - 5.6 %   Phosphorus    Collection Time: 01/21/22  8:10 AM   Result Value Ref Range    Phosphorus 4.4 2.5 - 4.5 mg/dL   Uric acid    Collection Time: 01/21/22  8:10 AM   Result Value Ref Range    Uric Acid 6.9 3.5 - 7.2 mg/dL   Lipid Profile    Collection Time: 01/21/22  8:10 AM   Result Value Ref Range    Cholesterol 170 (H) <170 mg/dL    Triglycerides 138 (H) <90 mg/dL    Direct Measure HDL 60 >=40 mg/dL    LDL Cholesterol Calculated 82 <=110 mg/dL    Non HDL Cholesterol 110 <120 mg/dL    Patient Fasting > 8hrs? Yes    Comprehensive metabolic panel    Collection Time: 01/21/22  8:10 AM   Result Value Ref Range    Sodium 142 133 - 144 mmol/L    Potassium 4.0 3.4 - 5.3 mmol/L    Chloride 106 98 - 110 mmol/L    Carbon Dioxide (CO2) 30 20 - 32 mmol/L    Anion Gap 6 3 - 14 mmol/L    Urea Nitrogen 12 7 - 30 mg/dL    Creatinine 1.03 (H) 0.50 - 1.00 mg/dL    Calcium 9.4 8.5 - 10.1 mg/dL    Glucose 90 70 - 99 mg/dL    Alkaline Phosphatase 36 (L) 65 - 260 U/L    AST 14 0 - 35 U/L    ALT 44 0 - 50 U/L    Protein Total 7.9 6.8 - 8.8 g/dL    Albumin 4.2 3.4 - 5.0 g/dL    Bilirubin Total 0.3 0.2 - 1.3 mg/dL    GFR Estimate >90 >60 mL/min/1.73m2

## 2022-01-21 NOTE — NURSING NOTE
"Saw Sandra \"Armando\" in clinic on 22 for Living Kidney Donor Evaluation.     Armando is interested in donation for his father.    I provided a folder which included copies of the followin. Living Kidney Donor Evaluation Consent  2. Paired Exchange Consent  3. Donor Shield Pamphlet  4. Living Donor Collective Study information  5. Kidney for Life pamphlet  6. Kidney Donors are Heroes! Study synopsis  7. SRTR Data from 21.      I also provided a parking pass and food voucher.      I reviewed the Living Kidney Donor Evaluation Consent, dated 2020 and Paired Exchange/ NDD consent dated 2018.  I answered any question.    Evaluation Notes:    Internal tissue typing sample collected.     Per Dr Canchola- patient should work on keeping BMI down and really consider the commitment ahead with him being so young. Give patient 3 months to focus on nutrition and consideration of donation and recheck cholesterol and A1C to see if they have come down at all. Should consider recipient going on  for SKP since blood type is AB and waiting on this donor.   "

## 2022-01-21 NOTE — PROGRESS NOTES
[facility]  Outpatient MNT: Kidney Donor Evaluation    Current BMI: 27.3 (HT 72 in,  lbs/91 kg)  BMI is within recommendation of <30 for kidney donation    8 Year Estimated Risk of T2DM  </= 3%     Time Spent: 15 minutes  Visit Type: Initial  Referring Physician: Sukhjinder   Pt accompanied by: self     Nutrition Assessment  Pt is in college here at the [employer]. He lives in an apartment near campus and does most of his own cooking.     Vitamins, Supplements, Pertinent Meds: none   Herbal Medicines/Supplements: none     Weight hx: stable     Food Security: any concerns about having enough money to buy food or access to grocery stores? No     Diet Recall  Breakfast Typically none, but recently started a protein shake this week (protein powder, fruit, almond milk)   Lunch Rice + protein + veggie (equal portions of all)   Dinner Same as L    Snacks None    Beverages Water    Alcohol None    Dining out Uses [employer] 1-2x/week      Physical Activity  Runs 3x/week for 30 min      Labs  Chol 170  HDL 60 LDL 82     FBG = 90  A1c = 5.8  BP = wnl x 3     Prediction of Incident Diabetes Mellitus in Middle-aged Adults: The [place] Offspring Study  Ronnie Richardson MD; James B. Meigs, MD, MPH; Marycarmen Miller, PhD; Amada Maynard MD, MPH; Parviz Whyte MD; Oli Correa Sr,   PhD  Pt's estimated risk for T2DM (per Table 6 above)  Pt received points for the following criteria: BMI>25  Total points: 2  8-Year estimated risk of T2DM: </= 3%    Nutrition Diagnosis  No nutrition diagnosis identified at this time.    Nutrition Intervention  Nutrition education provided:  Reviewed overall healthy diet guidelines for pre and post kidney donation. Discussed maintenance of a healthy weight and Na+ intake <3000 mg/day (<2000 mg/day if HTN).  Pt ok to do a protein shake daily, but if interested in more protein for weight lifting or athletic performance purposes after donation,  encouraged him to ask us for more guided recommendations.     Avoid the following post op d/t unknown effects on the organs:  - Herbal, Chinese, holistic, chiropractic, natural, alternative medicines and supplements  - Detoxes and cleanses  - Weight loss pills  - Protein powders or other products with extracts or herbs (ie green tea extract)    Patient Understanding: Pt verbalized understanding of education provided.  Expected Engagement: Good  Follow-Up Plans: PRN     Nutrition Goals  No nutrition goals identified at this time     Kenia Jalloh, RD, LD, CCTD

## 2022-01-21 NOTE — LETTER
1/21/2022     RE: Griffin Flores  [address] [place] [id number]    Dear Colleague,    Thank you for referring your patient, Griffin Flores, to the [facility]. Please see a copy of my visit note below.    Transplant Surgery Consult Note    Medical record number: [id number]  YOB: 2000,   Consult requested by the patient for evaluation of kidney donation candidacy.    Assessment and Recommendations: Mr. Flores appears to be a poor candidate for kidney donation at this point in the evaluation. The following issues will need to be addressed prior to formal review:    From a surgical perspective, patient would be a good candidate for laparoscopic hand assisted donor nephrectomy     The majority of our visit today was spent in counseling regarding the medical and surgical risks of kidney donation, the typical soila-and post-operative experience and recovery/return to work pattern.  We also talked about post-op visits and longer term health care maintenance, as well as the implications of having one remaining kidney. This discussion included, but was not limited to rates of complications such as bleeding, infection, need for transfusion, reoperation, other organ injury, future bowel obstruction, incisional hernia, port site pain, varicocele, venous thrombosis, pulmonary embolism, renal failure, and death (3 per 10,000). The patient understands that if end stage renal failure happens that dialysis or transplant would be required.   At the conclusion of the visit, all questions had been answered and Mr. Flores's candidacy for donation will be reviewed at our Multidisciplinary Donor Selection Committee. He will stay in contact with the nurse coordinator with any concerns.      35 min spent on the date of the encounter in chart review, patient visit, review of tests, documentation and/or discussion with other providers about the issues documented above.  Elvira Slaughter MD      ---------------------------------------------------------------------------------------------------    HPI: Griffin Flores is a 22 year old year old male who presents for a kidney donor evaluation.  Patient would like to donate to his father.      Personal history of:   No    Yes  Cancer:    [x]      []             Comment:     Diabetes   [x]      []  Comment:    Thombosis   [x]      []  Comment:       Hepatitis   [x]      []  Comment:    Tuberculosis   [x]      []  Comment: latent  Back or neck pain:  [x]      []  Comment:      Kidney stones   [x]      []  Comment:                  Kidney infections  [x]      []  Comment:           Urinary retention  [x]      []            Comment:   Regular NSAID use:  [x]      []            Comment:      Constipation:   [x]      []            Comment:      Zoroastrian  [x]      []            Comment:      Other:    []      []            Comment:         Past Medical History:   Diagnosis Date     Known health problems: none      Past Surgical History:   Procedure Laterality Date     NO HISTORY OF SURGERY       Family History   Problem Relation Age of Onset     No Known Problems Mother      Diabetes Father      Kidney failure Father      No Known Problems Sister      No Known Problems Sister      No Known Problems Sister      Unknown/Adopted Maternal Grandmother      Unknown/Adopted Maternal Grandfather      Diabetes Paternal Grandmother      Unknown/Adopted Paternal Grandfather      Social History     Socioeconomic History     Marital status: Single     Spouse name: Not on file     Number of children: Not on file     Years of education: Not on file     Highest education level: Not on file   Occupational History     Not on file   Tobacco Use     Smoking status: Never Smoker     Smokeless tobacco: Never Used   Substance and Sexual Activity     Alcohol use: Never     Drug use: Never     Sexual activity: Not on file   Other Topics Concern     Not on file   Social History  Narrative     Not on file     Social Determinants of Health     Financial Resource Strain: Not on file   Food Insecurity: Not on file   Transportation Needs: Not on file   Physical Activity: Not on file   Stress: Not on file   Social Connections: Not on file   Intimate Partner Violence: Not on file   Housing Stability: Not on file       ROS:   CONSTITUTIONAL:  No fevers or chills  EYES: negative for icterus  ENT:  negative for hearing loss, tinnitus and sore throat  RESPIRATORY:  negative for cough, sputum, dyspnea  CARDIOVASCULAR:  negative for chest pain  GASTROINTESTINAL:  negative for nausea, vomiting, diarrhea or constipation  GENITOURINARY:  negative for incontinence, dysuria, bladder emptying problems  HEME:  No easy bruising  INTEGUMENT:  negative for rash and pruritus  NEURO:  Negative for headache, seizure disorder    Allergies:   No Known Allergies    Medications:  none    Exam:     There were no vitals taken for this visit.    There is no height or weight on file to calculate BMI.  GEN:NAD  HEENT:NCAT, EOMI  RESP: breathing comfortably on room air  CARDS:RRR  ABD: soft, nontender, nondistended  MSK:WWP, moving all extremities  NEURO: CN II-XII intact; A/Ox3    Diagnostics:   No results found for this or any previous visit (from the past 336 hour(s)).    Again, thank you for allowing me to participate in the care of your patient.      Sincerely,    Elvira Slaughter MD

## 2022-01-21 NOTE — PROGRESS NOTES
Iohexol Timed Test Nursing Note    Griffin Flores comes to [facility] today for a Iohexol GFR Timed test.   Orders from Dr. Canchola were completed.    Progress Note    The following information was verified with the patient:  *Female patients are not pregnant or could not have become recently pregnant: YES, No, Not applicable  *Is there a history of allergy (skin rash, swelling, ect) to :   a. Iodine (except skin reactions to Betadine): NO   b. Intravenous radio-contrast agents: NO   c. Seafood: NO     RN provided patient with educational handout regarding timed test. YES    Medication administered :   Iohexol (Omnipaque 300 mg Iodine/ ml concentration) 5 mls.  Site administered Left AC  Start [id number]  Stop [id number]    Blood draws were taken by tx coordinator.      Patient tolerated the procedure well      Discharge Plan    Discharge instructions reviewed with patient: YES  Discharge papers printed and given to patient: YES  Patient/Representative verbalized understanding, all questions answered: YES      Ruth Cuevas RN

## 2022-01-21 NOTE — PROGRESS NOTES
Psychosocial Evaluation  Living Organ Donation per OPTN Policy 14.1.A  Organ Type: related, kidney  Presenting Information:  Mr. Griffin Flores, who goes by Armando, presents to the [facility] to complete a psychosocial evaluation since he is interested in becoming a kidney donor for your father, Mr. Zully Mclean, age 50.  Armando is [place].  His is first generation in the [place]  His parents came to the [place] in the 1980s.      PERSONAL BACKGROUND:  Current Living Situation: Armando is currently living at home in [place], [place].  When he is attending school, he lives in the dorms here on the [facility] of the [employer] [facility].    Education/Employment/Financial Situation: Armando is in his second year of college here at the [employer].  He is a physiology major.  He took this semester off in anticipation of being a donor and also due to the pandemic.  He may want to do a mater's degree in public health or epidemiology.  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  He is not concerned about financial burden.      Health Insurance Status: Armando does not have health care insurance, we discussed using the [place] [employer] website and I showed him the website and how to apply.  We also discussed talking to his parents about getting covered under their insurance.      Family History: He lives with his mother along with his younger sister, and his two older sisters live with his father.  One of his older sisters has graduated from the [employer] and is not working on her PhD.  He then moved to live with his father in 9th grade.  And that worked out very well with him.      General Health: no health care directive and does not have a PCP or primary care clinic.      Mental Health: Armando has some psychotherapy in 8th and 9th grade.  He was going through  a rough time at home deciding which parent he was going to live with.  The donor denies any past or present treatment for mental health issues, such as anxiety, depression, bipolar disorder, or disorders of thought such as schizophrenia or schizoaffective disorder.  There is no history of personality disorder or eating disorders.  The donor denies any need to see a counselor or therapist at this time.  The donor denies any past suicidal ideation, plans, or past attempts.  The donor denies any use of psychotropic medications at this time or in the past.  The donor denies any past history of hospitalization for psychiatric illnesses.  The donor denies any past history of ADHD or ADD.  The donor denies any history of educational issues or need for special educational services in their past history.    Alcohol and Drug Use/Abuse/Dependency: Armando reports that he consumes approximately ZERO servings of alcohol per week ( a serving is defined here as one, 12 oz beer, or one 4 oz glass of wine, or one 1 /2 oz of hard liquor).  The donor denies any past history of abuse or dependency on alcohol or illicit drugs. The donor denies any current use of street drugs, including marijuana, vaping, edible marijuana, or other mood altering substances.  The donor denies any past history of negative consequences of use of alcohol or drugs such as a DUI, relationship problems, problems with fulfilling parenting or other care giving responsibilities, or problems with work performance.       Cigarette Use: No cigaretts.    Legal: no legal issues.    Coping with major surgery/associated stress: Armando likes to do sports.  He loves to hang with friends.  He loves music.  He plays cello and has since the 5th grade.  He also plays guitar.  He loves to play 80s music on his guitar.  He was playing in the [employer], but when he moved here, he has not been playing.    Support System: Armando would have his sister help him post  "surgery.      DONOR SPECIFIC INFORMATION:  Relationship to Recipient: Mr. Moya wants to donate a kidney to his father, Mr. Zully Mclean, age 50. His father's kidney disease is caused by Type II diabetes.     Decision Process/Motivation to Donate: Armando says that his dad save his life.  Living with is mom was really rough.  When he had the opportunity to live with him, he took the leap of dione.  He did not really know his dad during his early life.  His dad taught him a lot.  Armando reports that , \"for me, it is just a small repayment for what my dad has done for me\".  He denies feeling any pressure or coercion to be a donor.  He denies being offered any form of payment to be a donor.       High risk behaviors as defined by [place] Public Health Services (PHS) that have potential to increase risk of disease transmission were reviewed and he denies any high risk behaviors.     PREPARATION FOR DONATION, RECOVERY, AND POTENTIAL SHORT-LONG-TERM OUTCOMES:  Understanding of the Living Donation Process:  We discussed the role of Independent Living Donor Advocate.  Short and long term medical and psychosocial risks to both, donor and recipient were reviewed and he is able to voice these issues.  Post surgical restrictions (2 weeks no driving, 6 weeks no lifting over 10 lbs) were reviewed and he appears capable of adhering to the post surgical requirements. The need for a caregiver was discussed and his sister will be his caregiver .  The risk of poor psychosocial outcome including problems with body image, post-surgery depression or anxiety, or feelings of emotional distress or bereavement if recipient experiences any recurrent disease, poor outcome or death was reviewed.  Additionally, potential financial implications, including the risk of having difficulty obtaining health care insurance, life insurance, disability insurance, or long term care insurance were reviewed, as were available donor grants to assist with " "donor related expenses.      We also discussed some unique issues that arise with paired kidney donation, which include the uncertainty of the timing and the importance of having a employment situation and support system that is able to provide sustained support and flexibility.    Mr. Griffin Bell appears capable of understanding this information and making an informed medical decision.    Impressions/Recommendations:   Mr. Moya \"Armando\" ADELAIDA Bell  is highly motivated to donate a kidney  to his father, Mr. Zully Mclean, age 50.  His decision to donate is free of inducement, coercion, or other undue pressure.   His housing, finances and employment are stable.  No current/active mental health or chemical abuse issues were identified.  The need for a caregiver was reviewed and he is able to identify a plan to meet his post operative care needs.  He appears capable of making an informed medical decision.  No psychosocial contraindications to living organ donation were identified and  I support Mr. Moya s desire to donate a a kidney to his father, Mr. Zully Mclean, age 50.         Contact Information:   MARIE Barnard, [facility]  Living Donor   [facility]  Pager:  [phone]  Direct:  [phone] Cell Phone  E-Mail:  [email or web address]      Time Spent: 40 minutes        "

## 2022-01-22 LAB
GAMMA INTERFERON BACKGROUND BLD IA-ACNC: 0.14 IU/ML
M TB IFN-G BLD-IMP: NEGATIVE
M TB IFN-G CD4+ BCKGRND COR BLD-ACNC: 9.86 IU/ML
MITOGEN IGNF BCKGRD COR BLD-ACNC: -0.03 IU/ML
MITOGEN IGNF BCKGRD COR BLD-ACNC: 0.03 IU/ML
QUANTIFERON MITOGEN: 10 IU/ML
QUANTIFERON NIL TUBE: 0.14 IU/ML
QUANTIFERON TB1 TUBE: 0.17 IU/ML
QUANTIFERON TB2 TUBE: 0.11

## 2022-01-23 LAB
STRONGYLOIDES IGG SER IA-ACNC: 0.4 IV
WNV IGG SER IA-ACNC: 0.81 IV
WNV IGM SER IA-ACNC: 0 IV

## 2022-01-24 LAB — TRYPANOSOMA CRUZI: NORMAL

## 2022-01-25 ENCOUNTER — DOCUMENTATION ONLY (OUTPATIENT)
Dept: TRANSPLANT | Facility: CLINIC | Age: 22
End: 2022-01-25

## 2022-01-25 NOTE — PROGRESS NOTES
Image Review Meeting    ATTENDEES: Kalpana Astorga, Rosetta Gomes, Janessa Corbin, Jesika Marroquin, Sheryl Maynard, Blossom Lawler, Javier Jefferson    IMAGES REVIEWED: CTA renal from 1/21/22    Impression:      1.   1a. The right kidney contains one artery, one vein, and one ureter.  1b. The right kidney parenchyma is normal. The renal volume is 213.70  cm3.     2.   2a. The left kidney contains 2 arteries, one vein, and one ureter.  2b. The left kidney parenchyma is normal. The renal volume is 240.19  cm3.       DECISION: LEFT WITH RIGHT AVAILABLE     INCIDENTALS: No

## 2022-01-26 ENCOUNTER — COMMITTEE REVIEW (OUTPATIENT)
Dept: TRANSPLANT | Facility: CLINIC | Age: 22
End: 2022-01-26

## 2022-01-26 ENCOUNTER — TELEPHONE (OUTPATIENT)
Dept: TRANSPLANT | Facility: CLINIC | Age: 22
End: 2022-01-26

## 2022-01-26 LAB
ATRIAL RATE - MUSE: 63 BPM
DIASTOLIC BLOOD PRESSURE - MUSE: NORMAL MMHG
INTERPRETATION ECG - MUSE: NORMAL
P AXIS - MUSE: 46 DEGREES
PR INTERVAL - MUSE: 124 MS
QRS DURATION - MUSE: 100 MS
QT - MUSE: 404 MS
QTC - MUSE: 413 MS
R AXIS - MUSE: 79 DEGREES
SYSTOLIC BLOOD PRESSURE - MUSE: NORMAL MMHG
T AXIS - MUSE: 53 DEGREES
VENTRICULAR RATE- MUSE: 63 BPM

## 2022-01-26 NOTE — TELEPHONE ENCOUNTER
Spoke to Cherie (sister-per his request since he was working) about committee review results:    1. Needs to make dietary modifications and exercise for 3 months and repeat hgb A1c, creat, triglycerides. May 1st 2022 will check in with him.  2. Need to complete Iron studies   3. Need to repeat Iohexol if he does make lifestyle changes

## 2022-01-26 NOTE — COMMITTEE REVIEW
Living Donor Committee Review Note Evaluation Date: 1/21/2022  Committee Review Date: 1/26/2022    Donor being evaluated for: Kidney    Transplant Phase: Evaluation  Transplant Status: Active    Transplant Coordinator: Nelly Madrid  Transplant Surgeon:       Committee Review Members:  Immunology Parviz Carter, PhD, Mary Zavaleta   Nephrology Billy Fragoso MD, Jaleel Malave MD, Odell Canchola MD   Nutrition Kenia Jalloh, JOSETTE   Pharmacist Cole Conroy, [facility], Sidra Carnes, [facility]    - Clinical Juani Masters, [facility], Nicolette Hay, [facility]   Transplant Mindy Rona Yo, RN, Radha Hightower, NP, Javier Jefferson, RN, Liliana Leon LPN, Mitzi Ambrocio, JOSE, Janessa Corbin, RN, Nelly Beard, RN   Transplant Surgery Lara Cronin MD, Beatriz Astorga MD, MD       Transplant Eligibility: Acceptable Physical Health, Acceptable Mental Health    Committee Review Decision: Needs Re-presentation    Relative Contraindications: None    Absolute Contraindications: None    Committee Chair Lara Cronin MD verbally attested to the committee's decision.    Committee Discussion Details:   1. Needs to make dietary modifications and exercise for 3 months and repeat hgb A1c, creat, triglycerides.   2. Need to complete Iron studies   3. Need to repeat Iohexol if he does make lifestyle changes

## 2022-02-06 ENCOUNTER — HEALTH MAINTENANCE LETTER (OUTPATIENT)
Age: 22
End: 2022-02-06

## 2022-02-17 NOTE — PROGRESS NOTES
Transplant Surgery Consult Note    Medical record number: [id number]  YOB: 2000,   Consult requested by the patient for evaluation of kidney donation candidacy.    Assessment and Recommendations: Mr. Flores appears to be a poor candidate for kidney donation at this point in the evaluation. The following issues will need to be addressed prior to formal review:    From a surgical perspective, patient would be a good candidate for laparoscopic hand assisted donor nephrectomy     The majority of our visit today was spent in counseling regarding the medical and surgical risks of kidney donation, the typical soila-and post-operative experience and recovery/return to work pattern.  We also talked about post-op visits and longer term health care maintenance, as well as the implications of having one remaining kidney. This discussion included, but was not limited to rates of complications such as bleeding, infection, need for transfusion, reoperation, other organ injury, future bowel obstruction, incisional hernia, port site pain, varicocele, venous thrombosis, pulmonary embolism, renal failure, and death (3 per 10,000). The patient understands that if end stage renal failure happens that dialysis or transplant would be required.   At the conclusion of the visit, all questions had been answered and Mr. Flores's candidacy for donation will be reviewed at our Multidisciplinary Donor Selection Committee. He will stay in contact with the nurse coordinator with any concerns.      35 min spent on the date of the encounter in chart review, patient visit, review of tests, documentation and/or discussion with other providers about the issues documented above.  Elvira Slaughter MD     ---------------------------------------------------------------------------------------------------    HPI: Griffin Flores is a 22 year old year old male who presents for a kidney donor evaluation.  Patient would like to donate to his father.       Personal history of:   No    Yes  Cancer:    [x]      []             Comment:     Diabetes   [x]      []  Comment:    Thombosis   [x]      []  Comment:       Hepatitis   [x]      []  Comment:    Tuberculosis   [x]      []  Comment: latent  Back or neck pain:  [x]      []  Comment:      Kidney stones   [x]      []  Comment:                  Kidney infections  [x]      []  Comment:           Urinary retention  [x]      []            Comment:   Regular NSAID use:  [x]      []            Comment:      Constipation:   [x]      []            Comment:      Evangelical  [x]      []            Comment:      Other:    []      []            Comment:         Past Medical History:   Diagnosis Date     Known health problems: none      Past Surgical History:   Procedure Laterality Date     NO HISTORY OF SURGERY       Family History   Problem Relation Age of Onset     No Known Problems Mother      Diabetes Father      Kidney failure Father      No Known Problems Sister      No Known Problems Sister      No Known Problems Sister      Unknown/Adopted Maternal Grandmother      Unknown/Adopted Maternal Grandfather      Diabetes Paternal Grandmother      Unknown/Adopted Paternal Grandfather      Social History     Socioeconomic History     Marital status: Single     Spouse name: Not on file     Number of children: Not on file     Years of education: Not on file     Highest education level: Not on file   Occupational History     Not on file   Tobacco Use     Smoking status: Never Smoker     Smokeless tobacco: Never Used   Substance and Sexual Activity     Alcohol use: Never     Drug use: Never     Sexual activity: Not on file   Other Topics Concern     Not on file   Social History Narrative     Not on file     Social Determinants of Health     Financial Resource Strain: Not on file   Food Insecurity: Not on file   Transportation Needs: Not on file   Physical Activity: Not on file   Stress: Not on file   Social Connections: Not  on file   Intimate Partner Violence: Not on file   Housing Stability: Not on file       ROS:   CONSTITUTIONAL:  No fevers or chills  EYES: negative for icterus  ENT:  negative for hearing loss, tinnitus and sore throat  RESPIRATORY:  negative for cough, sputum, dyspnea  CARDIOVASCULAR:  negative for chest pain  GASTROINTESTINAL:  negative for nausea, vomiting, diarrhea or constipation  GENITOURINARY:  negative for incontinence, dysuria, bladder emptying problems  HEME:  No easy bruising  INTEGUMENT:  negative for rash and pruritus  NEURO:  Negative for headache, seizure disorder    Allergies:   No Known Allergies    Medications:  none    Exam:     There were no vitals taken for this visit.    There is no height or weight on file to calculate BMI.  GEN:NAD  HEENT:NCAT, EOMI  RESP: breathing comfortably on room air  CARDS:RRR  ABD: soft, nontender, nondistended  MSK:WWP, moving all extremities  NEURO: CN II-XII intact; A/Ox3      Diagnostics:   No results found for this or any previous visit (from the past 336 hour(s)).

## 2022-03-22 DIAGNOSIS — Z00.5 TRANSPLANT DONOR EVALUATION: Primary | ICD-10-CM

## 2022-03-22 RX ORDER — NALOXONE HYDROCHLORIDE 0.4 MG/ML
0.2 INJECTION, SOLUTION INTRAMUSCULAR; INTRAVENOUS; SUBCUTANEOUS
Status: CANCELLED | OUTPATIENT
Start: 2022-03-28

## 2022-03-22 RX ORDER — MEPERIDINE HYDROCHLORIDE 25 MG/ML
25 INJECTION INTRAMUSCULAR; INTRAVENOUS; SUBCUTANEOUS EVERY 30 MIN PRN
Status: CANCELLED | OUTPATIENT
Start: 2022-03-28

## 2022-03-22 RX ORDER — ALBUTEROL SULFATE 90 UG/1
1-2 AEROSOL, METERED RESPIRATORY (INHALATION)
Status: CANCELLED
Start: 2022-03-28

## 2022-03-22 RX ORDER — METHYLPREDNISOLONE SODIUM SUCCINATE 125 MG/2ML
125 INJECTION, POWDER, LYOPHILIZED, FOR SOLUTION INTRAMUSCULAR; INTRAVENOUS
Status: CANCELLED
Start: 2022-03-28

## 2022-03-22 RX ORDER — DIPHENHYDRAMINE HYDROCHLORIDE 50 MG/ML
50 INJECTION INTRAMUSCULAR; INTRAVENOUS
Status: CANCELLED
Start: 2022-03-28

## 2022-03-22 RX ORDER — ALBUTEROL SULFATE 0.83 MG/ML
2.5 SOLUTION RESPIRATORY (INHALATION)
Status: CANCELLED | OUTPATIENT
Start: 2022-03-28

## 2022-03-22 RX ORDER — EPINEPHRINE 1 MG/ML
0.3 INJECTION, SOLUTION, CONCENTRATE INTRAVENOUS EVERY 5 MIN PRN
Status: CANCELLED | OUTPATIENT
Start: 2022-03-28

## 2022-04-28 DIAGNOSIS — Z00.5 TRANSPLANT DONOR EVALUATION: Primary | ICD-10-CM

## 2022-04-28 PROCEDURE — 81382 HLA II TYPING 1 LOC HR: CPT | Performed by: SURGERY

## 2022-04-28 PROCEDURE — 81378 HLA I & II TYPING HR: CPT | Performed by: SURGERY

## 2022-05-06 ENCOUNTER — TELEPHONE (OUTPATIENT)
Dept: TRANSPLANT | Facility: CLINIC | Age: 22
End: 2022-05-06

## 2022-05-06 NOTE — TELEPHONE ENCOUNTER
Pt's sister called said her brother needed to come back and re- do some labs  That got misplaced  Call her with the  appt time for him and she will relay it to the pt

## 2022-05-09 ENCOUNTER — TELEPHONE (OUTPATIENT)
Dept: TRANSPLANT | Facility: CLINIC | Age: 22
End: 2022-05-09

## 2022-05-10 ENCOUNTER — TELEPHONE (OUTPATIENT)
Dept: TRANSPLANT | Facility: CLINIC | Age: 22
End: 2022-05-10

## 2022-05-10 DIAGNOSIS — Z00.5 TRANSPLANT DONOR EVALUATION: Primary | ICD-10-CM

## 2022-05-10 NOTE — TELEPHONE ENCOUNTER
Spoke to Roel regarding his repeat Iohexol and labs. He will call me once he has scheduled his Iohexol and we will do labs on the same day.

## 2022-05-19 ENCOUNTER — OFFICE VISIT (OUTPATIENT)
Dept: INFUSION THERAPY | Facility: CLINIC | Age: 22
End: 2022-05-19
Attending: INTERNAL MEDICINE

## 2022-05-19 VITALS
TEMPERATURE: 98.1 F | BODY MASS INDEX: 28.17 KG/M2 | OXYGEN SATURATION: 99 % | WEIGHT: 208 LBS | SYSTOLIC BLOOD PRESSURE: 123 MMHG | RESPIRATION RATE: 16 BRPM | HEIGHT: 72 IN | HEART RATE: 66 BPM | DIASTOLIC BLOOD PRESSURE: 80 MMHG

## 2022-05-19 DIAGNOSIS — Z00.5 TRANSPLANT DONOR EVALUATION: Primary | ICD-10-CM

## 2022-05-19 LAB
CHOLEST SERPL-MCNC: 158 MG/DL
CREAT SERPL-MCNC: 1.13 MG/DL (ref 0.66–1.25)
FASTING STATUS PATIENT QL REPORTED: YES
FASTING STATUS PATIENT QL REPORTED: YES
FERRITIN SERPL-MCNC: 213 NG/ML (ref 26–388)
GFR SERPL CREATININE-BSD FRML MDRD: >90 ML/MIN/1.73M2
GLUCOSE BLD-MCNC: 102 MG/DL (ref 70–99)
HBA1C MFR BLD: 5.6 % (ref 0–5.6)
HDLC SERPL-MCNC: 55 MG/DL
IRON SATN MFR SERPL: 22 % (ref 15–46)
IRON SERPL-MCNC: 81 UG/DL (ref 35–180)
LDLC SERPL CALC-MCNC: 83 MG/DL
NONHDLC SERPL-MCNC: 103 MG/DL
TIBC SERPL-MCNC: 369 UG/DL (ref 240–430)
TRIGL SERPL-MCNC: 98 MG/DL

## 2022-05-19 PROCEDURE — 80061 LIPID PANEL: CPT

## 2022-05-19 PROCEDURE — 82728 ASSAY OF FERRITIN: CPT

## 2022-05-19 PROCEDURE — 83036 HEMOGLOBIN GLYCOSYLATED A1C: CPT

## 2022-05-19 PROCEDURE — 250N000011 HC RX IP 250 OP 636: Performed by: INTERNAL MEDICINE

## 2022-05-19 PROCEDURE — 82542 COL CHROMOTOGRAPHY QUAL/QUAN: CPT | Performed by: INTERNAL MEDICINE

## 2022-05-19 PROCEDURE — 82565 ASSAY OF CREATININE: CPT

## 2022-05-19 PROCEDURE — 36415 COLL VENOUS BLD VENIPUNCTURE: CPT

## 2022-05-19 PROCEDURE — 82947 ASSAY GLUCOSE BLOOD QUANT: CPT

## 2022-05-19 PROCEDURE — 83550 IRON BINDING TEST: CPT

## 2022-05-19 PROCEDURE — 36415 COLL VENOUS BLD VENIPUNCTURE: CPT | Performed by: INTERNAL MEDICINE

## 2022-05-19 RX ORDER — METHYLPREDNISOLONE SODIUM SUCCINATE 125 MG/2ML
125 INJECTION, POWDER, LYOPHILIZED, FOR SOLUTION INTRAMUSCULAR; INTRAVENOUS
Status: CANCELLED
Start: 2022-05-19

## 2022-05-19 RX ORDER — DIPHENHYDRAMINE HYDROCHLORIDE 50 MG/ML
50 INJECTION INTRAMUSCULAR; INTRAVENOUS
Status: CANCELLED
Start: 2022-05-19

## 2022-05-19 RX ORDER — MEPERIDINE HYDROCHLORIDE 25 MG/ML
25 INJECTION INTRAMUSCULAR; INTRAVENOUS; SUBCUTANEOUS EVERY 30 MIN PRN
Status: CANCELLED | OUTPATIENT
Start: 2022-05-19

## 2022-05-19 RX ORDER — ALBUTEROL SULFATE 0.83 MG/ML
2.5 SOLUTION RESPIRATORY (INHALATION)
Status: CANCELLED | OUTPATIENT
Start: 2022-05-19

## 2022-05-19 RX ORDER — ALBUTEROL SULFATE 90 UG/1
1-2 AEROSOL, METERED RESPIRATORY (INHALATION)
Status: CANCELLED
Start: 2022-05-19

## 2022-05-19 RX ORDER — EPINEPHRINE 1 MG/ML
0.3 INJECTION, SOLUTION INTRAMUSCULAR; SUBCUTANEOUS EVERY 5 MIN PRN
Status: CANCELLED | OUTPATIENT
Start: 2022-05-19

## 2022-05-19 RX ORDER — NALOXONE HYDROCHLORIDE 0.4 MG/ML
0.2 INJECTION, SOLUTION INTRAMUSCULAR; INTRAVENOUS; SUBCUTANEOUS
Status: CANCELLED | OUTPATIENT
Start: 2022-05-19

## 2022-05-19 RX ADMIN — IOHEXOL 5 ML: 300 INJECTION, SOLUTION INTRAVENOUS at 07:13

## 2022-05-19 ASSESSMENT — PAIN SCALES - GENERAL: PAINLEVEL: NO PAIN (0)

## 2022-05-19 NOTE — LETTER
5/19/2022         RE: Griffin Flores  [address] [place] [id number]        Dear Colleague,    Thank you for referring your patient, Griffin Flores, to the [facility]. Please see a copy of my visit note below.    Infusion Nursing Note:  Griffin Flores presents today for iohexol test.    Patient seen by provider today: No   present during visit today: Not Applicable.    Note/Intravenous Access:  Iohexol administered through 24G PIV to right upper forearm. PIV removed. New PIV placed to left upper forearm and specimens drawn from this PIV at 0915 (120 minutes post iohexol) and 1115 (240 minutes post iohexol).     Treatment Conditions:  Patient confirms no allergies to shellfish, iodine, or contrast dye.    Post Infusion Assessment:  Patient tolerated injection without incident.     Discharge Plan:   Discharge instructions reviewed with: Patient.  AVS to patient via MYCHART.  Patient discharged in stable condition accompanied by: self.  Departure Mode: Ambulatory.    Administrations This Visit     iohexol (OMNIPAQUE 300) injection 5 mL     Admin Date  05/19/2022 Action  Given Dose  5 mL Route  Intravenous Administered By  Danielle Gonzales, RN              Danielle Gonzales, RN        Again, thank you for allowing me to participate in the care of your patient.      Sincerely,    [facility]

## 2022-05-19 NOTE — PROGRESS NOTES
Infusion Nursing Note:  Griffin Flores presents today for iohexol test.    Patient seen by provider today: No   present during visit today: Not Applicable.    Note/Intravenous Access:  Iohexol administered through 24G PIV to right upper forearm. PIV removed. New PIV placed to left upper forearm and specimens drawn from this PIV at 0915 (120 minutes post iohexol) and 1115 (240 minutes post iohexol).     Treatment Conditions:  Patient confirms no allergies to shellfish, iodine, or contrast dye.    Post Infusion Assessment:  Patient tolerated injection without incident.     Discharge Plan:   Discharge instructions reviewed with: Patient.  AVS to patient via [employer].  Patient discharged in stable condition accompanied by: self.  Departure Mode: Ambulatory.    Administrations This Visit     iohexol (OMNIPAQUE 300) injection 5 mL     Admin Date  05/19/2022 Action  Given Dose  5 mL Route  Intravenous Administered By  Danielle Gonzales, RN              Danielle Gonzales, RN

## 2022-05-19 NOTE — PATIENT INSTRUCTIONS
Dear Griffin Flores    Thank you for choosing [facility] ([facility]) for your test.  The following information is a summary of our appointment as well as important reminders.      Return to [facility] for lab draw at 0915 and 1115. Please come about 5-10 minutes prior to your draw time so your nurse has time to get you into a room.     We look forward in seeing you on your next appointment here at [facility]  Please don t hesitate to call us at [phone] to reschedule any of your appointments or to speak with one of the [facility] registered nurses.  It was a pleasure taking care of you today.    Sincerely,    [facility]  [address] [place] [address]  [id number]  Phone:  [phone]

## 2022-05-24 LAB
BSA: 2.21 M2
IOHEXOL CL UR+SERPL-VRATE: 121 ML/MIN
IOHEXOL CL UR+SERPL-VRATE: 3.21 MG/DL
IOHEXOL CL UR+SERPL-VRATE: 6.15 MG/DL
IOHEXOL CL UR+SERPL-VRATE: 95 /1.73 M2

## 2022-05-25 ENCOUNTER — TELEPHONE (OUTPATIENT)
Dept: TRANSPLANT | Facility: CLINIC | Age: 22
End: 2022-05-25

## 2022-05-25 ENCOUNTER — E-CONSULT (OUTPATIENT)
Dept: ONCOLOGY | Facility: CLINIC | Age: 22
End: 2022-05-25

## 2022-05-25 ENCOUNTER — COMMITTEE REVIEW (OUTPATIENT)
Dept: TRANSPLANT | Facility: CLINIC | Age: 22
End: 2022-05-25

## 2022-05-25 DIAGNOSIS — Z00.5 TRANSPLANT DONOR EVALUATION: Primary | ICD-10-CM

## 2022-05-25 PROCEDURE — 99451 NTRPROF PH1/NTRNET/EHR 5/>: CPT | Performed by: INTERNAL MEDICINE

## 2022-05-25 NOTE — PROGRESS NOTES
ALL SMARTFIELDS MUST BE COMPLETED FOR PATIENT CARE AND BILLING    5/25/2022     E-Consult has been accept    Interprofessional consultation requested by:  Billy Fragoso MD      Clinical Question/Purpose: MY CLINICAL QUESTION IS: Potential Kidney donor & possibly has microcystosis. Iron labs are WNL but looking to rule out Hemoglobinopathies.     Patient assessment and information reviewed:   What is pt's ethnic origin?Likely beta or alpha thalassemia      Recommendations: Hemoglobin eval (electorphoresis), reticulocyte count, LDH , haptoglobin blood smear for morphology alpha globin gene testing beta globin gene testing        The recommendations provided in this E-Consult are based on a review of clinical data pertinent to the clinical question presented, without a review of the patient's complete medical record or, the benefit of a comprehensive in-person or virtual patient evaluation. This consultation should not replace the clinical judgement and evaluation of the provider ordering this E-Consult. Any new clinical issues, or changes in patient status since the filing of this E-Consult will need to be taken into account when assessing these recommendations. Please contact me if you have further questions.    My total time spent reviewing clinical information and formulating assessment was 15 minutes.    Report sent automatically to requesting provider once signed.     Wu Nelson MD

## 2022-05-25 NOTE — COMMITTEE REVIEW
Living Donor Committee Review Note Evaluation Date: 1/21/2022  Committee Review Date: 5/25/2022    Donor being evaluated for: Kidney    Transplant Phase: Evaluation  Transplant Status: Active    Transplant Coordinator: Nelly Madrid  Transplant Surgeon:       Committee Review Members:  Kiran Jalloh, JOSETTE   Pharmacist Cole Conroy, [facility]    - Clinical Juani Masters, [facility], Nicolette Hay, [facility], Shani Haines   Transplant Mindy Rona Yo, RN, Sheryl Maynard, RN, Liliana Leon, LPN, Mitzi Ambrocio, JOSE, Janessa Corbin, JOSE, Nelly Beard, RN   Transplant Surgery Poncho Oquendo MD, Beatriz Astorga MD, MD       Transplant Eligibility: Acceptable Physical Health, Acceptable Mental Health    Committee Review Decision: Needs Re-presentation    Relative Contraindications: None    Absolute Contraindications: None    Committee Chair Beatriz Astorga MD verbally attested to the committee's decision.    Committee Discussion Details:   1. Iohexol-reviewed and acceptable  2. hgb A1c 5.6 [id number]  3. Triglycerides 138 down to 98-acceptable   4. Iron studies WNL  5. Need to draw a hgb electrophoresis and have Anoulith see hematology after.  6. Tissue typing pending

## 2022-05-25 NOTE — TELEPHONE ENCOUNTER
Spoke to Griffin about committee review results:  1. Iohexol-reviewed and acceptable  2. hgb A1c 5.6 [id number]  3. Triglycerides 138 down to 98-acceptable   4. Iron studies WNL  5. Need to draw a hgb electrophoresis and have Anoulith see hematology after.  6. Tissue typing pending

## 2022-05-26 DIAGNOSIS — Z00.5 TRANSPLANT DONOR EVALUATION: Primary | ICD-10-CM

## 2022-05-27 ENCOUNTER — LAB (OUTPATIENT)
Dept: LAB | Facility: CLINIC | Age: 22
End: 2022-05-27

## 2022-05-27 DIAGNOSIS — Z00.5 TRANSPLANT DONOR EVALUATION: ICD-10-CM

## 2022-05-27 LAB
BASOPHILS # BLD AUTO: 0 10E3/UL (ref 0–0.2)
BASOPHILS NFR BLD AUTO: 0 %
EOSINOPHIL # BLD AUTO: 0.1 10E3/UL (ref 0–0.7)
EOSINOPHIL NFR BLD AUTO: 2 %
ERYTHROCYTE [DISTWIDTH] IN BLOOD BY AUTOMATED COUNT: 15.3 % (ref 10–15)
HCT VFR BLD AUTO: 47.5 % (ref 40–53)
HGB BLD-MCNC: 14.2 G/DL (ref 13.3–17.7)
IMM GRANULOCYTES # BLD: 0 10E3/UL
IMM GRANULOCYTES NFR BLD: 0 %
LDH SERPL L TO P-CCNC: 122 U/L (ref 85–227)
LYMPHOCYTES # BLD AUTO: 1.7 10E3/UL (ref 0.8–5.3)
LYMPHOCYTES NFR BLD AUTO: 36 %
MCH RBC QN AUTO: 20.5 PG (ref 26.5–33)
MCHC RBC AUTO-ENTMCNC: 29.9 G/DL (ref 31.5–36.5)
MCV RBC AUTO: 68 FL (ref 78–100)
MONOCYTES # BLD AUTO: 0.3 10E3/UL (ref 0–1.3)
MONOCYTES NFR BLD AUTO: 7 %
NEUTROPHILS # BLD AUTO: 2.7 10E3/UL (ref 1.6–8.3)
NEUTROPHILS NFR BLD AUTO: 55 %
NRBC # BLD AUTO: 0 10E3/UL
NRBC BLD AUTO-RTO: 0 /100
PATH REPORT.COMMENTS IMP SPEC: NORMAL
PATH REPORT.COMMENTS IMP SPEC: NORMAL
PATH REPORT.FINAL DX SPEC: NORMAL
PATH REPORT.MICROSCOPIC SPEC OTHER STN: NORMAL
PATH REPORT.MICROSCOPIC SPEC OTHER STN: NORMAL
PATH REPORT.RELEVANT HX SPEC: NORMAL
PLATELET # BLD AUTO: 194 10E3/UL (ref 150–450)
RBC # BLD AUTO: 6.94 10E6/UL (ref 4.4–5.9)
RETICS # AUTO: 0.05 10E6/UL (ref 0.03–0.1)
RETICS/RBC NFR AUTO: 0.7 % (ref 0.5–2)
WBC # BLD AUTO: 4.8 10E3/UL (ref 4–11)

## 2022-05-27 PROCEDURE — 85025 COMPLETE CBC W/AUTO DIFF WBC: CPT | Performed by: PATHOLOGY

## 2022-05-27 PROCEDURE — 83615 LACTATE (LD) (LDH) ENZYME: CPT | Performed by: PATHOLOGY

## 2022-05-27 PROCEDURE — 85660 RBC SICKLE CELL TEST: CPT | Mod: 90 | Performed by: PATHOLOGY

## 2022-05-27 PROCEDURE — 83021 HEMOGLOBIN CHROMOTOGRAPHY: CPT | Mod: 90 | Performed by: PATHOLOGY

## 2022-05-27 PROCEDURE — 81269 HBA1/HBA2 GENE DUP/DEL VRNTS: CPT | Mod: 90 | Performed by: PATHOLOGY

## 2022-05-27 PROCEDURE — 85045 AUTOMATED RETICULOCYTE COUNT: CPT | Performed by: PATHOLOGY

## 2022-05-27 PROCEDURE — 85060 BLOOD SMEAR INTERPRETATION: CPT | Performed by: PATHOLOGY

## 2022-05-27 PROCEDURE — 99000 SPECIMEN HANDLING OFFICE-LAB: CPT | Performed by: PATHOLOGY

## 2022-05-27 PROCEDURE — 83010 ASSAY OF HAPTOGLOBIN QUANT: CPT | Mod: 90 | Performed by: PATHOLOGY

## 2022-05-27 PROCEDURE — 36415 COLL VENOUS BLD VENIPUNCTURE: CPT | Performed by: PATHOLOGY

## 2022-05-27 PROCEDURE — 81364 HBB FULL GENE SEQUENCE: CPT | Mod: 90 | Performed by: PATHOLOGY

## 2022-05-27 PROCEDURE — 81259 HBA1/HBA2 FULL GENE SEQUENCE: CPT | Mod: 90 | Performed by: PATHOLOGY

## 2022-05-27 PROCEDURE — 83020 HEMOGLOBIN ELECTROPHORESIS: CPT | Mod: 90 | Performed by: PATHOLOGY

## 2022-05-29 LAB
HGB A1 MFR BLD: 97 %
HGB A2 MFR BLD: 2.5 %
HGB C MFR BLD: 0 %
HGB E MFR BLD: 0 %
HGB F MFR BLD: 0.5 %
HGB FRACT BLD ELPH-IMP: NORMAL
HGB OTHER MFR BLD: 0 %
HGB S BLD QL SOLY: NORMAL
HGB S MFR BLD: 0 %
PATH INTERP BLD-IMP: NORMAL

## 2022-05-31 LAB — HAPTOGLOB SERPL-MCNC: 161 MG/DL (ref 32–197)

## 2022-06-03 LAB
HBA SEQ, DEL/DUP INTERP: NORMAL
SPECIMEN SOURCE: NORMAL

## 2022-06-07 ENCOUNTER — ONCOLOGY VISIT (OUTPATIENT)
Dept: TRANSPLANT | Facility: CLINIC | Age: 22
End: 2022-06-07
Attending: INTERNAL MEDICINE

## 2022-06-07 ENCOUNTER — PATIENT OUTREACH (OUTPATIENT)
Dept: ONCOLOGY | Facility: CLINIC | Age: 22
End: 2022-06-07

## 2022-06-07 VITALS
OXYGEN SATURATION: 98 % | BODY MASS INDEX: 26.99 KG/M2 | TEMPERATURE: 98 F | SYSTOLIC BLOOD PRESSURE: 131 MMHG | DIASTOLIC BLOOD PRESSURE: 74 MMHG | WEIGHT: 199.3 LBS | HEART RATE: 65 BPM | HEIGHT: 72 IN | RESPIRATION RATE: 18 BRPM

## 2022-06-07 DIAGNOSIS — Z00.5 TRANSPLANT DONOR EVALUATION: ICD-10-CM

## 2022-06-07 PROCEDURE — G0463 HOSPITAL OUTPT CLINIC VISIT: HCPCS

## 2022-06-07 PROCEDURE — 99205 OFFICE O/P NEW HI 60 MIN: CPT | Performed by: INTERNAL MEDICINE

## 2022-06-07 ASSESSMENT — PAIN SCALES - GENERAL: PAINLEVEL: NO PAIN (0)

## 2022-06-07 NOTE — PROGRESS NOTES
[facility]: Cancer Care                                                                                          RN met with pt, Roel, and his sister, Cherie, today. Introduced self and role of RN Care Coordinator at [facility]. Provided my contact information via [facility] phone number.   Discussed use of MyChart for questions if needed (not symptom use). Pt complete authorization to discuss PHI form.  Gave form to BOH team to enter into Epic.  Completed learning assessment with patient.  Pt denied any questions at end of conversation.    Pt aware plan is not for return unless he needs (not scheduling a return).  Dr. Nelson gave pt handout on new diagnosis of thalassemia.    Signature:  Cat Caceres, RN, OCN

## 2022-06-07 NOTE — NURSING NOTE
"Oncology Rooming Note    June 7, 2022 7:55 AM   Griffin Flores is a 22 year old male who presents for:    Chief Complaint   Patient presents with     New Patient     Transplant donor evaluation      Initial Vitals: /74   Pulse 65   Temp 98  F (36.7  C) (Oral)   Resp 18   Ht 1.817 m (5' 11.54\")   Wt 90.4 kg (199 lb 4.8 oz)   SpO2 98%   BMI 27.38 kg/m   Estimated body mass index is 27.38 kg/m  as calculated from the following:    Height as of this encounter: 1.817 m (5' 11.54\").    Weight as of this encounter: 90.4 kg (199 lb 4.8 oz). Body surface area is 2.14 meters squared.  No Pain (0) Comment: Data Unavailable   No LMP for male patient.  Allergies reviewed: Yes  Medications reviewed: Yes    Medications: Medication refills not needed today.  Pharmacy name entered into EPIC: Data Unavailable    Clinical concerns: New patient here for transplant donor evaluation.  Dr. Nelosn  was NOT notified.      Chantel Ross, [facility]            "

## 2022-06-07 NOTE — PROGRESS NOTES
Hematology/Oncology Consult Note    Griffin Flores MRN# [id number]   Age: 22 year old YOB: 2000          Reason for Consult:   Microcytosis, potential kidney donor         Assessment and Plan:   ASSESSMENT:    1.  Alpha thalassemia minor,--SEA   [id number]  2.  History of COVID.     Haseeb is a healthy young man with alpha thalassemia minor.  I reviewed with him and his sister the etiology of alpha thalassemia minor and the imbalance of alpha and beta globin production leading to small red cells.  He does have the [place]  2 deletion alpha thalassemia, and we did discuss with him the importance of genetic counseling if he were to plan to have children, as a risk for hemoglobin H disease and Linwood hemoglobin with hydrops fetalis would be a possibility.  We also talked about not taking excess iron.  His iron values are fine.  Most importantly, there is no reason he cannot be a kidney donor for his father.  He has no abnormal risk. In thalassemia, there are some patients who can be prothrombotic, so one might consider perioperative DVT prophylaxis per usual.  I suggested that his sisters be screened for alpha thalassemia and discuss with his mother and father.  If you have any questions, please feel free to contact me.     I mentioned to Haseeb that he should have a normal lifespan and be sure that a doctor does not give him an iron prescription because he has microcytosis on a CBC in the future.    I spent a total of 60 minutes face to face with Griffin Flores during today's office visit. Over 50% of this time was spent counseling the patient and coordinating the care regarding alpha thalassemia minor and [id number] preparing to see the patient and  care coordination            History of Present Illness:   History obtained from chart review and confirmed with patient.      Griffin Flores is a 22 year old here for evaluation of microcytosis and referred by Transplant Nephrology  for him to be a donor of a kidney for his diabetic father with kidney failure.  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  His father is a diabetic who developed kidney failure over the last couple of years and started dialysis in 10/2021.  Haseeb is willing to be a kidney donor. Workup showed that Haseeb had microcytosis with a hemoglobin of 14.6, a red count of 7.2 and MCV of 68 with a normal platelet count and white blood count. Liver functions and kidney functions were normal.  He had a normal hemoglobin electrophoresis.  His ferritin was 213.  A blood smear showed microcytosis.  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  Specifically, he had 1 copy of the --SEA alpha globin gene deletion detected by deletion duplication analysis of the alpha globin gene cluster and its HS-40 regulatory region.  No other pathogenic variants.  This pathogenic variant is a deletion observed in [place]  populations and removes approximately 20 KB of both HBA1-HBA2 on the same chromosome, and no functional mRNA is produced.  Heterozygosity of this deletion is associated with mild anemia and microcytosis, whereas homozygosity results in hemoglobin Linwood hydrops fetalis.  This is all consistent with alpha thalassemia minor.  Of note, his father has an MCV of 69.  His mother is [place], but her mother was from [facility].  His father is also [place].  He denies any fever, chills, nausea or vomiting.  He was found to have COVID in 2020, but was not hospitalized.  He has received 2 Pfizer vaccines.      A       Review of Systems:   A comprehensive ROS was performed with the patient and was found to be negative with the exception of that noted in the HPI above.  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  Social History     Socioeconomic History     Marital status: Single     Spouse name: Not on file     Number of children: Not on file     Years of education: Not on file     Highest education level: Not on file   Occupational History     Not on file   Tobacco Use     Smoking status: Never Smoker     Smokeless tobacco: Never Used   Substance and Sexual Activity     Alcohol use: Never     Drug use: Never     Sexual activity: Not on file   Other Topics Concern     Not on file   Social History Narrative     Not on file     Social Determinants of Health     Financial Resource Strain: Not on file   Food Insecurity: Not on file   Transportation Needs: Not on file   Physical Activity: Not on file   Stress: Not on file   Social Connections: Not on file   Intimate Partner Violence: Not on file   Housing Stability: Not on file            Family History:     Family History   Problem Relation Age of Onset     No Known Problems Mother      Diabetes Father      Kidney failure Father      No Known Problems Sister      No Known Problems Sister      No Known Problems Sister      Unknown/Adopted Maternal Grandmother      Unknown/Adopted Maternal Grandfather      Diabetes Paternal Grandmother      Unknown/Adopted Paternal Grandfather    4 sibs  All healthy none knows they had a blood issue  Mother took pills for anemia   Maternal Grandmother was from [facility]         Allergies:   No Known Allergies         Medications:   (Not in a hospital admission)           Physical Exam:   /74   Pulse 65   Temp 98  " F (36.7  C) (Oral)   Resp 18   Ht 1.817 m (5' 11.54\")   Wt 90.4 kg (199 lb 4.8 oz)   SpO2 98%   BMI 27.38 kg/m    Vitals:    22 0752   Weight: 90.4 kg (199 lb 4.8 oz)     General: Appears well, sitting in bed, in no acute distress.  Heme/Lymph: No overt bleeding. No cervical, axillary, or supraclavicular adenopathy.  Skin: No concerning lesions, rash, jaundice, cyanosis, erythema, or ecchymoses on exposed surfaces.  HEENT: NCAT. EOMI, anicteric sclera. Oral mucosa pink and moist with no lesions or thrush.  Respiratory: Non-labored breathing, good air exchange, lungs clear to auscultation bilaterally.  Cardiovascular: Regular rate and rhythm. No murmur or rub.   Gastrointestinal: Normoactive bowel sounds. Abdomen soft, non-distended, and non-tender. No palpable masses or organomegaly.  Extremities: No extremity edema.   Neurologic: A&O x 3, speech normal, sensation to light touch grossly WNL.         Data:   I have personally reviewed the following labs/imaging:  CBC@LABRCNTIPR(wbc:4,rbc:4,hgb:4,hct:4,mcv:4,mch:4,mchc:4,rdw:4,plt:4)@  CMP@LABRCNTIPR(na:4,potassium:4,chloride:4,co2:4,aniongap:4,g,bun:4,cr:4,gfrestimated:4,gfrestblack:4,bridgett:4,ma,phos:4,prottotal:4,albumin:4,bilitotal:4,alkphos:4,ast:4,alt:4)@  INR@LABRCNTIPR(inr:4)@                 Latest Reference Range & Units 22 11:23   Haptoglobin 32 - 197 mg/dL 161      Latest Reference Range & Units 22 06:20 22 11:23   Creatinine 0.66 - 1.25 mg/dL 1.13    GFR Estimate >60 mL/min/1.73m2 >90 [1]    Cholesterol <200 mg/dL 158    Patient Fasting > 8hrs?  Yes  Yes    Ferritin 26 - 388 ng/mL 213    HDL Cholesterol >=40 mg/dL 55    Hemoglobin A 95.0 - 97.9 %  97.0   Hemoglobin A1C 0.0 - 5.6 % 5.6 [2]    Hemoglobin A2 2.0 - 3.5 %  2.5   Hemoglobin C 0.0 - 0.0 %  0.0   Hemoglobin E 0.0 - 0.0 %  0.0   Hemoglobin F 0.0 - 2.1 %  0.5 [3]   Hemoglobin S 0.0 - 0.0 %  0.0   Hemoglobin Other 0.0 - 0.0 %  0.0   Iron 35 - 180 ug/dL 81    Iron " Binding Cap 240 - 430 ug/dL 369    Iron Saturation Index 15 - 46 % 22    Lactate Dehydrogenase 85 - 227 U/L  122   LDL Cholesterol Calculated <=100 mg/dL 83    Non HDL Cholesterol <130 mg/dL 103    Triglycerides <150 mg/dL 98    Glucose 70 - 99 mg/dL 102 (H)    WBC 4.0 - 11.0 10e3/uL  4.8   Hemoglobin 13.3 - 17.7 g/dL  14.2   Hematocrit 40.0 - 53.0 %  47.5   Platelet Count 150 - 450 10e3/uL  194   RBC Count 4.40 - 5.90 10e6/uL  6.94 (H)   MCV 78 - 100 fL  68 (L)   MCH 26.5 - 33.0 pg  20.5 (L)   MCHC 31.5 - 36.5 g/dL  29.9 (L)   RDW 10.0 - 15.0 %  15.3 (H)   % Neutrophils %  55   % Lymphocytes %  36   % Monocytes %  7   % Eosinophils %  2   % Basophils %  0   Absolute Basophils 0.0 - 0.2 10e3/uL  0.0   Absolute Eosinophils 0.0 - 0.7 10e3/uL  0.1   Absolute Immature Granulocytes <=0.4 10e3/uL  0.0   Absolute Lymphocytes 0.8 - 5.3 10e3/uL  1.7   Absolute Monocytes 0.0 - 1.3 10e3/uL  0.3   % Immature Granulocytes %  0   Absolute Neutrophils 1.6 - 8.3 10e3/uL  2.7   Absolute NRBCs 10e3/uL  0.0   NRBCs per 100 WBC <1 /100  0   % Retic 0.5 - 2.0 %  0.7   Absolute Retic 0.025 - 0.095 10e6/uL  0.051   (H): Data is abnormally high  (L): Data is abnormally low  [1] Effective December 21, 2021 eGFRcr in adults is calculated using the 2021 CKD-EPI creatinine equation which includes age and gender (Amber et al., NEJM, DOI: 10.1056/UNJXny5105069)  [2] Normal <5.7%   Prediabetes 5.7-6.4%    Diabetes 6.5% or higher     Note: Adopted from ADA consensus guidelines.  [3] REFERENCE INTERVAL: Hemoglobin F    Access complete set of age- and/or gender-specific   reference intervals for this test in the [facility]   Test Directory ([place]).

## 2022-06-07 NOTE — LETTER
6/7/2022         RE: Griffin Flores  [address] [place] [id number]        Dear Colleague,    Thank you for referring your patient, Griffin Flores, to the [facility] BLOOD AND [facility] TRANSPLANT PRO[facility] [place]. Please see a copy of my visit note below.       Hematology/Oncology Consult Note    Griffin Flores MRN# [id number]   Age: 22 year old YOB: 2000          Reason for Consult:   Microcytosis, potential kidney donor         Assessment and Plan:   ASSESSMENT:    1.  Alpha thalassemia minor,--SEA   [id number]  2.  History of COVID.     Haseeb is a healthy young man with alpha thalassemia minor.  I reviewed with him and his sister the etiology of alpha thalassemia minor and the imbalance of alpha and beta globin production leading to small red cells.  He does have the [place]  2 deletion alpha thalassemia, and we did discuss with him the importance of genetic counseling if he were to plan to have children, as a risk for hemoglobin H disease and Linwood hemoglobin with hydrops fetalis would be a possibility.  We also talked about not taking excess iron.  His iron values are fine.  Most importantly, there is no reason he cannot be a kidney donor for his father.  He has no abnormal risk. In thalassemia, there are some patients who can be prothrombotic, so one might consider perioperative DVT prophylaxis per usual.  I suggested that his sisters be screened for alpha thalassemia and discuss with his mother and father.  If you have any questions, please feel free to contact me.     I mentioned to Haseeb that he should have a normal lifespan and be sure that a doctor does not give him an iron prescription because he has microcytosis on a CBC in the future.    I spent a total of 60 minutes face to face with Griffin Flores during today's office visit. Over 50% of this time was spent counseling the patient and coordinating the care regarding alpha thalassemia minor  and [id number] preparing to see the patient and  care coordination            History of Present Illness:   History obtained from chart review and confirmed with patient.      Griffin Flores is a 22 year old here for evaluation of microcytosis and referred by Transplant Nephrology for him to be a donor of a kidney for his diabetic father with kidney failure.  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  His father is a diabetic who developed kidney failure over the last couple of years and started dialysis in 10/2021.  Haseeb is willing to be a kidney donor. Workup showed that Haseeb had microcytosis with a hemoglobin of 14.6, a red count of 7.2 and MCV of 68 with a normal platelet count and white blood count. Liver functions and kidney functions were normal.  He had a normal hemoglobin electrophoresis.  His ferritin was 213.  A blood smear showed microcytosis.  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  Specifically, he had 1 copy of the --SEA alpha globin gene deletion detected by deletion duplication analysis of the alpha globin gene cluster and its HS-40 regulatory region.  No other pathogenic variants.  This pathogenic variant is a deletion observed in East  populations and removes approximately 20 KB of both HBA1-HBA2 on the same chromosome, and no functional mRNA is produced.  Heterozygosity of this deletion is associated with mild anemia and microcytosis, whereas homozygosity results in hemoglobin Linwood hydrops fetalis.  This is all consistent with alpha thalassemia minor.  Of note, his father has an MCV of 69.  His mother is [place], but her mother was from [facility].  His father is also [place].  He denies any fever, chills, nausea or vomiting.  He was found to have COVID in 2020, but was not hospitalized.  He has received 2 Pfizer  vaccines.      A       Review of Systems:   A comprehensive ROS was performed with the patient and was found to be negative with the exception of that noted in the HPI above.  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  Social History     Socioeconomic History     Marital status: Single     Spouse name: Not on file     Number of children: Not on file     Years of education: Not on file     Highest education level: Not on file   Occupational History     Not on file   Tobacco Use     Smoking status: Never Smoker     Smokeless tobacco: Never Used   Substance and Sexual Activity     Alcohol use: Never     Drug use: Never     Sexual activity: Not on file   Other Topics Concern     Not on file   Social History Narrative     Not on file     Social Determinants of Health     Financial Resource Strain: Not on file   Food Insecurity: Not on file   Transportation Needs: Not on file   Physical Activity: Not on file   Stress: Not on file   Social Connections: Not on file   Intimate Partner Violence: Not on file   Housing Stability: Not on file            Family History:     Family History   Problem Relation Age of Onset     No Known Problems Mother      Diabetes Father      Kidney failure Father      No Known Problems Sister      No Known Problems Sister      No Known Problems Sister      Unknown/Adopted Maternal Grandmother      Unknown/Adopted Maternal Grandfather      Diabetes Paternal Grandmother      Unknown/Adopted  "Paternal Grandfather    4 sibs  All healthy none knows they had a blood issue  Mother took pills for anemia   Maternal Grandmother was from [facility]         Allergies:   No Known Allergies         Medications:   (Not in a hospital admission)           Physical Exam:   /74   Pulse 65   Temp 98  F (36.7  C) (Oral)   Resp 18   Ht 1.817 m (5' 11.54\")   Wt 90.4 kg (199 lb 4.8 oz)   SpO2 98%   BMI 27.38 kg/m    Vitals:    22 0752   Weight: 90.4 kg (199 lb 4.8 oz)     General: Appears well, sitting in bed, in no acute distress.  Heme/Lymph: No overt bleeding. No cervical, axillary, or supraclavicular adenopathy.  Skin: No concerning lesions, rash, jaundice, cyanosis, erythema, or ecchymoses on exposed surfaces.  HEENT: NCAT. EOMI, anicteric sclera. Oral mucosa pink and moist with no lesions or thrush.  Respiratory: Non-labored breathing, good air exchange, lungs clear to auscultation bilaterally.  Cardiovascular: Regular rate and rhythm. No murmur or rub.   Gastrointestinal: Normoactive bowel sounds. Abdomen soft, non-distended, and non-tender. No palpable masses or organomegaly.  Extremities: No extremity edema.   Neurologic: A&O x 3, speech normal, sensation to light touch grossly WNL.         Data:   I have personally reviewed the following labs/imaging:  CBC@LABRCNTIPR(wbc:4,rbc:4,hgb:4,hct:4,mcv:4,mch:4,mchc:4,rdw:4,plt:4)@  CMP@LABRCNTIPR(na:4,potassium:4,chloride:4,co2:4,aniongap:4,g,bun:4,cr:4,gfrestimated:4,gfrestblack:4,bridgett:4,ma,phos:4,prottotal:4,albumin:4,bilitotal:4,alkphos:4,ast:4,alt:4)@  INR@LABRCNTIPR(inr:4)@                 Latest Reference Range & Units 22 11:23   Haptoglobin 32 - 197 mg/dL 161      Latest Reference Range & Units 22 06:20 22 11:23   Creatinine 0.66 - 1.25 mg/dL 1.13    GFR Estimate >60 mL/min/1.73m2 >90 [1]    Cholesterol <200 mg/dL 158    Patient Fasting > 8hrs?  Yes  Yes    Ferritin 26 - 388 ng/mL 213    HDL Cholesterol >=40 mg/dL 55  "   Hemoglobin A 95.0 - 97.9 %  97.0   Hemoglobin A1C 0.0 - 5.6 % 5.6 [2]    Hemoglobin A2 2.0 - 3.5 %  2.5   Hemoglobin C 0.0 - 0.0 %  0.0   Hemoglobin E 0.0 - 0.0 %  0.0   Hemoglobin F 0.0 - 2.1 %  0.5 [3]   Hemoglobin S 0.0 - 0.0 %  0.0   Hemoglobin Other 0.0 - 0.0 %  0.0   Iron 35 - 180 ug/dL 81    Iron Binding Cap 240 - 430 ug/dL 369    Iron Saturation Index 15 - 46 % 22    Lactate Dehydrogenase 85 - 227 U/L  122   LDL Cholesterol Calculated <=100 mg/dL 83    Non HDL Cholesterol <130 mg/dL 103    Triglycerides <150 mg/dL 98    Glucose 70 - 99 mg/dL 102 (H)    WBC 4.0 - 11.0 10e3/uL  4.8   Hemoglobin 13.3 - 17.7 g/dL  14.2   Hematocrit 40.0 - 53.0 %  47.5   Platelet Count 150 - 450 10e3/uL  194   RBC Count 4.40 - 5.90 10e6/uL  6.94 (H)   MCV 78 - 100 fL  68 (L)   MCH 26.5 - 33.0 pg  20.5 (L)   MCHC 31.5 - 36.5 g/dL  29.9 (L)   RDW 10.0 - 15.0 %  15.3 (H)   % Neutrophils %  55   % Lymphocytes %  36   % Monocytes %  7   % Eosinophils %  2   % Basophils %  0   Absolute Basophils 0.0 - 0.2 10e3/uL  0.0   Absolute Eosinophils 0.0 - 0.7 10e3/uL  0.1   Absolute Immature Granulocytes <=0.4 10e3/uL  0.0   Absolute Lymphocytes 0.8 - 5.3 10e3/uL  1.7   Absolute Monocytes 0.0 - 1.3 10e3/uL  0.3   % Immature Granulocytes %  0   Absolute Neutrophils 1.6 - 8.3 10e3/uL  2.7   Absolute NRBCs 10e3/uL  0.0   NRBCs per 100 WBC <1 /100  0   % Retic 0.5 - 2.0 %  0.7   Absolute Retic 0.025 - 0.095 10e6/uL  0.051   (H): Data is abnormally high  (L): Data is abnormally low  [1] Effective December 21, 2021 eGFRcr in adults is calculated using the 2021 CKD-EPI creatinine equation which includes age and gender (Amber et al., NEJM, DOI: 10.1056/RPTOqm8889299)  [2] Normal <5.7%   Prediabetes 5.7-6.4%    Diabetes 6.5% or higher     Note: Adopted from ADA consensus guidelines.  [3] REFERENCE INTERVAL: Hemoglobin F    Access complete set of age- and/or gender-specific   reference intervals for this test in the [facility]   Test Directory  ([email or web address]).        Again, thank you for allowing me to participate in the care of your patient.      Sincerely,    Wu Nelson MD

## 2022-06-08 LAB — HBB GENE MUT ANL BLD/T: NORMAL

## 2022-06-15 ENCOUNTER — TELEPHONE (OUTPATIENT)
Dept: TRANSPLANT | Facility: CLINIC | Age: 22
End: 2022-06-15

## 2022-06-15 ENCOUNTER — DOCUMENTATION ONLY (OUTPATIENT)
Dept: TRANSPLANT | Facility: CLINIC | Age: 22
End: 2022-06-15

## 2022-06-15 ENCOUNTER — COMMITTEE REVIEW (OUTPATIENT)
Dept: TRANSPLANT | Facility: CLINIC | Age: 22
End: 2022-06-15

## 2022-06-15 NOTE — COMMITTEE REVIEW
Living Donor Committee Review Note Evaluation Date: 1/21/2022  Committee Review Date: 6/15/2022    Donor being evaluated for: Kidney    Transplant Phase: Evaluation  Transplant Status: Active    Transplant Coordinator: Nelly Madrid  Transplant Surgeon:       Committee Review Members:  Clinic Dominique Leach, JOSE   Immunology Parviz Carter, PhD   Nephrology Billy Fragoso MD, Odell Canchola MD   Nutrition Kenia Jalloh, JOSETTE   Pharmacist Cole Conroy, [facility]    - Clinical Juani Masters, [facility], Nicolette Hay [facility], Shani Haines   Transplant Mindy Rona Yo, RN, Sheryl Maynard, RN, Javier Jefferson, JOSE, Liliana Leon LPN, Mitzi Ambrocio, JOSE, Janessa Corbin, JOSE, Nelly Beard, RN   Transplant Surgery [facility] Sonia Weber MD, Lara Cronin MD, Elvira Slaughter MD       Transplant Eligibility: Acceptable Physical Health, Acceptable Mental Health    Committee Review Decision: Approved    Relative Contraindications: None    Absolute Contraindications: None    Committee Chair Lara Cronin MD verbally attested to the committee's decision.    Committee Discussion Details:   1. Reviewed Hematology consult-okay to proceed with donation and would need perioperative DVT PPX.

## 2022-06-15 NOTE — PHARMACY-MEDICATION REGIMEN REVIEW
Pharmacy Living Kidney Donor Medication Evaluation     This patient is a 22 year old male being considered for living kidney donation. As part of the kidney pre-donation patient evaluation, pharmacy has screened this patient's electronic medical record for medication related concerns.    Assessment / Plan    No significant potential medication related issues are expected for this patient post surgery, based on the medical record medication list review.  Pharmacy will continue to participate in this patient's care throughout the surgery course.  Please contact pharmacy with any further medication related questions or concerns.         Cole Conroy, Pharm.D.  [facility]  [phone]

## 2022-06-15 NOTE — TELEPHONE ENCOUNTER
"Spoke to Griffin \"Roel\" regarding committee review results:  1. Reviewed Hematology consult-okay to proceed with donation and would need perioperative DVT PPX.     He is approved to do direct donation to his father. Waiting on father to be ready for transplant.   "

## 2022-10-03 ENCOUNTER — HEALTH MAINTENANCE LETTER (OUTPATIENT)
Age: 22
End: 2022-10-03

## 2023-02-11 ENCOUNTER — HEALTH MAINTENANCE LETTER (OUTPATIENT)
Age: 23
End: 2023-02-11

## 2023-02-21 DIAGNOSIS — Z52.4 ENCOUNTER FOR DONATION OF KIDNEY: Primary | ICD-10-CM

## 2023-02-28 ENCOUNTER — DOCUMENTATION ONLY (OUTPATIENT)
Dept: TRANSPLANT | Facility: CLINIC | Age: 23
End: 2023-02-28

## 2023-03-01 ENCOUNTER — TELEPHONE (OUTPATIENT)
Dept: TRANSPLANT | Facility: CLINIC | Age: 23
End: 2023-03-01

## 2023-03-01 NOTE — TELEPHONE ENCOUNTER
SW called Armando and reviewed [employer] Donor Shield and it's benefits. Encouraged him to enroll through link in his email for wages and travel. Provided contact info.     Shani Haines [facility]CONCHIS, CCTSW   Living Donor   [facility]  Direct: [phone]  E-Mail: [email or web address]

## 2023-03-06 LAB
ABO/RH(D): NORMAL
ANTIBODY SCREEN: NEGATIVE
SPECIMEN EXPIRATION DATE: NORMAL

## 2023-03-07 ENCOUNTER — OFFICE VISIT (OUTPATIENT)
Dept: TRANSPLANT | Facility: CLINIC | Age: 23
End: 2023-03-07
Attending: SURGERY

## 2023-03-07 ENCOUNTER — ALLIED HEALTH/NURSE VISIT (OUTPATIENT)
Dept: TRANSPLANT | Facility: CLINIC | Age: 23
End: 2023-03-07
Attending: SURGERY

## 2023-03-07 ENCOUNTER — LAB (OUTPATIENT)
Dept: LAB | Facility: CLINIC | Age: 23
End: 2023-03-07

## 2023-03-07 VITALS
OXYGEN SATURATION: 99 % | WEIGHT: 206.1 LBS | HEART RATE: 50 BPM | HEIGHT: 72 IN | RESPIRATION RATE: 16 BRPM | DIASTOLIC BLOOD PRESSURE: 91 MMHG | BODY MASS INDEX: 27.92 KG/M2 | TEMPERATURE: 98.1 F | SYSTOLIC BLOOD PRESSURE: 138 MMHG

## 2023-03-07 DIAGNOSIS — Z52.4 ENCOUNTER FOR DONATION OF KIDNEY: ICD-10-CM

## 2023-03-07 DIAGNOSIS — Z52.4 ENCOUNTER FOR DONATION OF KIDNEY: Primary | ICD-10-CM

## 2023-03-07 LAB
ALBUMIN UR-MCNC: 50 MG/DL
ALBUMIN UR-MCNC: NEGATIVE MG/DL
APPEARANCE UR: ABNORMAL
APPEARANCE UR: CLEAR
BILIRUB UR QL STRIP: NEGATIVE
BILIRUB UR QL STRIP: NEGATIVE
COLOR UR AUTO: ABNORMAL
COLOR UR AUTO: ABNORMAL
CREAT SERPL-MCNC: 1.11 MG/DL (ref 0.67–1.17)
GFR SERPL CREATININE-BSD FRML MDRD: >90 ML/MIN/1.73M2
GLUCOSE UR STRIP-MCNC: NEGATIVE MG/DL
GLUCOSE UR STRIP-MCNC: NEGATIVE MG/DL
HBV CORE AB SERPL QL IA: NONREACTIVE
HBV SURFACE AG SERPL QL IA: NONREACTIVE
HCV AB SERPL QL IA: NONREACTIVE
HGB BLD-MCNC: 14.9 G/DL (ref 13.3–17.7)
HGB UR QL STRIP: ABNORMAL
HGB UR QL STRIP: NEGATIVE
HIV 1+2 AB+HIV1 P24 AG SERPL QL IA: NONREACTIVE
KETONES UR STRIP-MCNC: NEGATIVE MG/DL
KETONES UR STRIP-MCNC: NEGATIVE MG/DL
LEUKOCYTE ESTERASE UR QL STRIP: NEGATIVE
LEUKOCYTE ESTERASE UR QL STRIP: NEGATIVE
MUCOUS THREADS #/AREA URNS LPF: PRESENT /LPF
MUCOUS THREADS #/AREA URNS LPF: PRESENT /LPF
NITRATE UR QL: NEGATIVE
NITRATE UR QL: NEGATIVE
PH UR STRIP: 5.5 [PH] (ref 5–7)
PH UR STRIP: 6 [PH] (ref 5–7)
RBC URINE: 1 /HPF
RBC URINE: 5 /HPF
SARS-COV-2 RNA RESP QL NAA+PROBE: NEGATIVE
SP GR UR STRIP: 1.02 (ref 1–1.03)
SP GR UR STRIP: 1.02 (ref 1–1.03)
SPERM #/AREA URNS HPF: PRESENT /HPF
SPERM #/AREA URNS HPF: PRESENT /HPF
SQUAMOUS EPITHELIAL: 1 /HPF
SQUAMOUS EPITHELIAL: <1 /HPF
TRANSITIONAL EPI: <1 /HPF
UROBILINOGEN UR STRIP-MCNC: NORMAL MG/DL
UROBILINOGEN UR STRIP-MCNC: NORMAL MG/DL
WBC URINE: 0 /HPF
WBC URINE: 0 /HPF

## 2023-03-07 PROCEDURE — 86803 HEPATITIS C AB TEST: CPT | Mod: 90 | Performed by: PATHOLOGY

## 2023-03-07 PROCEDURE — U0003 INFECTIOUS AGENT DETECTION BY NUCLEIC ACID (DNA OR RNA); SEVERE ACUTE RESPIRATORY SYNDROME CORONAVIRUS 2 (SARS-COV-2) (CORONAVIRUS DISEASE [COVID-19]), AMPLIFIED PROBE TECHNIQUE, MAKING USE OF HIGH THROUGHPUT TECHNOLOGIES AS DESCRIBED BY CMS-2020-01-R: HCPCS | Mod: 90 | Performed by: PATHOLOGY

## 2023-03-07 PROCEDURE — 87535 HIV-1 PROBE&REVERSE TRNSCRPJ: CPT | Mod: 90 | Performed by: PATHOLOGY

## 2023-03-07 PROCEDURE — 81003 URINALYSIS AUTO W/O SCOPE: CPT | Performed by: SURGERY

## 2023-03-07 PROCEDURE — 87340 HEPATITIS B SURFACE AG IA: CPT | Mod: 90 | Performed by: PATHOLOGY

## 2023-03-07 PROCEDURE — 87521 HEPATITIS C PROBE&RVRS TRNSC: CPT | Mod: 90 | Performed by: PATHOLOGY

## 2023-03-07 PROCEDURE — 99000 SPECIMEN HANDLING OFFICE-LAB: CPT | Performed by: PATHOLOGY

## 2023-03-07 PROCEDURE — G0463 HOSPITAL OUTPT CLINIC VISIT: HCPCS | Performed by: NURSE PRACTITIONER

## 2023-03-07 PROCEDURE — 87798 DETECT AGENT NOS DNA AMP: CPT | Mod: 90 | Performed by: PATHOLOGY

## 2023-03-07 PROCEDURE — 85018 HEMOGLOBIN: CPT | Performed by: PATHOLOGY

## 2023-03-07 PROCEDURE — 86704 HEP B CORE ANTIBODY TOTAL: CPT | Mod: 90 | Performed by: PATHOLOGY

## 2023-03-07 PROCEDURE — 82565 ASSAY OF CREATININE: CPT | Performed by: PATHOLOGY

## 2023-03-07 PROCEDURE — U0005 INFEC AGEN DETEC AMPLI PROBE: HCPCS | Mod: 90 | Performed by: PATHOLOGY

## 2023-03-07 PROCEDURE — 99215 OFFICE O/P EST HI 40 MIN: CPT | Performed by: NURSE PRACTITIONER

## 2023-03-07 PROCEDURE — 36415 COLL VENOUS BLD VENIPUNCTURE: CPT | Performed by: PATHOLOGY

## 2023-03-07 PROCEDURE — 86850 RBC ANTIBODY SCREEN: CPT | Mod: 90 | Performed by: PATHOLOGY

## 2023-03-07 PROCEDURE — 99205 OFFICE O/P NEW HI 60 MIN: CPT | Performed by: SURGERY

## 2023-03-07 PROCEDURE — 86901 BLOOD TYPING SEROLOGIC RH(D): CPT | Mod: 90 | Performed by: PATHOLOGY

## 2023-03-07 PROCEDURE — 87516 HEPATITIS B DNA AMP PROBE: CPT | Mod: 90 | Performed by: PATHOLOGY

## 2023-03-07 PROCEDURE — 81001 URINALYSIS AUTO W/SCOPE: CPT | Performed by: PATHOLOGY

## 2023-03-07 PROCEDURE — 86665 EPSTEIN-BARR CAPSID VCA: CPT | Mod: 90 | Performed by: PATHOLOGY

## 2023-03-07 PROCEDURE — 87086 URINE CULTURE/COLONY COUNT: CPT | Performed by: SURGERY

## 2023-03-07 PROCEDURE — 86900 BLOOD TYPING SEROLOGIC ABO: CPT | Mod: 90 | Performed by: PATHOLOGY

## 2023-03-07 ASSESSMENT — PAIN SCALES - GENERAL: PAINLEVEL: NO PAIN (0)

## 2023-03-07 NOTE — NURSING NOTE
Saw Armando in clinic for donor nephrectomy pre-op.  Armando was accompanied by his sister.  Surgery is scheduled for 3/16/23, with a 3C check-in time of 0530.      I reviewed:    Surgery check-in procedure    Day -1 shower, ensure clear, and diet guidelines    Expectations for day of surgery including pre and post-op     ERAS teaching information including having patient demonstrate use of incentive spirometer    Post-op lifting restriction and the required donor follow up     I reviewed the importance of the post-op lifting restriction and the required donor follow up. Armando verbalized understanding of the above information.  Armando remains extremely motivated and excited to donate.  I gave Armanod the donor gift items. Questions answered.  Armando knows how to contact me with any further questions or concerns.     Final XM and Serologies: drawn and pending    Nelly Madrid RN, BSN  Living Donor Coordinator

## 2023-03-07 NOTE — LETTER
3/7/2023         RE: Griffin Flores  [address] [place] [address]        Dear Colleague,    Thank you for referring your patient, Griffin Flores, to the [facility] TRANSPLANT [facility]. Please see a copy of my visit note below.    Transplant Surgery H&P                                                        HPI:                                                      Mr. Flores is a 23 year old male who comes to clinic today for preop prior to planned laparoscopic kidney donation surgery. The patient was previously reviewed by the living donor multidisciplinary selection committee and found to be medically and psychosocially appropriate for voluntary kidney donation. The patient denies any feelings of being pressured to proceed with kidney donation.  Health events since donor evaluation: None    Special considerations:   Constipation: no  PONV: no  History of Urinary retention? no  Significant neck/back/joint concerns for lateral decubitus positioning?: No  Church: No    MEDICAL HISTORY:                                                      Patient Active Problem List    Diagnosis Date Noted     Transplant donor evaluation 01/04/2022     Priority: Medium      Past Medical History:   Diagnosis Date     Known health problems: none      Past Surgical History:   Procedure Laterality Date     NO HISTORY OF SURGERY       No current outpatient medications on file.     OTC products: None, except as noted above  No Known Allergies   Social History     Tobacco Use     Smoking status: Never     Smokeless tobacco: Never   Substance Use Topics     Alcohol use: Never     History   Drug Use Unknown       REVIEW OF SYSTEMS:                                                    CONSTITUTIONAL: NEGATIVE for fever, chills, change in weight  INTEGUMENTARY/SKIN: NEGATIVE for worrisome rashes, moles or lesions  EYES: NEGATIVE for vision changes or irritation  ENT/MOUTH: NEGATIVE for ear, mouth and throat  "problems  RESP: NEGATIVE for significant cough or SOB  CV: NEGATIVE for chest pain, palpitations or peripheral edema  GI: NEGATIVE for nausea, abdominal pain, heartburn, or change in bowel habits  : NEGATIVE for frequency, dysuria, or hematuria  MUSCULOSKELETAL: NEGATIVE for significant arthralgias or myalgia  NEURO: NEGATIVE for weakness, dizziness or paresthesias  ENDOCRINE: NEGATIVE for temperature intolerance, skin/hair changes  HEME: NEGATIVE for bleeding problems  PSYCHIATRIC: NEGATIVE for changes in mood or affect    EXAM:                                                    BP (!) 138/91 (BP Location: Right arm, Patient Position: Sitting, Cuff Size: Adult Large)   Pulse 50   Temp 98.1  F (36.7  C) (Oral)   Resp 16   Ht 1.817 m (5' 11.54\")   Wt 93.5 kg (206 lb 1.6 oz)   SpO2 99%   BMI 28.32 kg/m      GENERAL APPEARANCE: healthy, alert and no distress     EYES: EOMI,  PERRL     HENT: ear canals and TM's normal and nose and mouth without ulcers or lesions     NECK: no adenopathy, no asymmetry, masses, or scars and thyroid normal to palpation     RESP: lungs clear to auscultation - no rales, rhonchi or wheezes     CV: regular rates and rhythm, normal S1 S2, no S3 or S4 and no murmur, click or rub     ABDOMEN:  soft, nontender, no HSM or masses and bowel sounds normal     MS: extremities normal- no gross deformities noted, no evidence of inflammation in joints, FROM in all extremities.     SKIN: no suspicious lesions or rashes     NEURO: Normal strength and tone, sensory exam grossly normal, mentation intact and speech normal     PSYCH: mentation appears normal. and affect normal/bright     LYMPHATICS: No cervical adenopathy    DIAGNOSTICS:                                                      EK22 \"Sinus rhythm with sinus arrhythmia\"   Chest Xray-wnl 22  Labs Resulted Today:   Results for orders placed or performed in visit on 23   UA with Microscopic reflex to Culture     Status: " Abnormal    Specimen: Urine, Midstream   Result Value Ref Range    Color Urine Light Yellow Colorless, Straw, Light Yellow, Yellow    Appearance Urine Slightly Cloudy (A) Clear    Glucose Urine Negative Negative mg/dL    Bilirubin Urine Negative Negative    Ketones Urine Negative Negative mg/dL    Specific Gravity Urine 1.025 1.003 - 1.035    Blood Urine Trace (A) Negative    pH Urine 6.0 5.0 - 7.0    Protein Albumin Urine 50 (A) Negative mg/dL    Urobilinogen Urine Normal Normal, 2.0 mg/dL    Nitrite Urine Negative Negative    Leukocyte Esterase Urine Negative Negative    Mucus Urine Present (A) None Seen /LPF    Sperm Urine Present (A) None Seen /HPF    RBC Urine 5 (H) <=2 /HPF    WBC Urine 0 <=5 /HPF    Squamous Epithelials Urine 1 <=1 /HPF    Transitional Epithelials Urine <1 <=1 /HPF    Narrative    Urine Culture not indicated   Hemoglobin     Status: Normal   Result Value Ref Range    Hemoglobin 14.9 13.3 - 17.7 g/dL   Creatinine     Status: Normal   Result Value Ref Range    Creatinine 1.11 0.67 - 1.17 mg/dL    GFR Estimate >90 >60 mL/min/1.73m2   ABO/Rh type and screen     Status: None (In process)    Narrative    The following orders were created for panel order ABO/Rh type and screen.  Procedure                               Abnormality         Status                     ---------                               -----------         ------                     Adult Type and Screen[[id number]]                            In process                   Please view results for these tests on the individual orders.     Recent Labs   Lab Test 03/07/23  0925 05/27/22  1123 05/19/22  0620 01/21/22  0810   HGB 14.9 14.2  --  14.6   PLT  --  194  --  203   INR  --   --   --  1.02   NA  --   --   --  142   POTASSIUM  --   --   --  4.0   CR 1.11  --  1.13 1.03*   A1C  --   --  5.6 5.8*      Assessment:                                                    Healthy voluntary kidney donor    There have been no significant  intercurrent medical problems or change of intent in proceeding with kidney donation as scheduled on 3/16/23.    1. Labs reviewed and within normal limits: Yes  2. EKG (1/21/22): Sinus rhythm with sinus arrhythmia   3. Paired Exchange case: No  4. ABO= A POS  5. Laterality: left  6. Outstanding issues: final COVID screen and CXR pending     Plan:                                                      1. Consent: Not Done  2. Outstanding issues: Final COVID screen and CXR pending     Signed Electronically by: AZUL Miles CNP        Again, thank you for allowing me to participate in the care of your patient.        Sincerely,        AZUL Miles CNP

## 2023-03-07 NOTE — H&P (VIEW-ONLY)
Transplant Surgery H&P                                                        HPI:                                                      Mr. Flores is a 23 year old male who comes to clinic today for preop prior to planned laparoscopic kidney donation surgery. The patient was previously reviewed by the living donor multidisciplinary selection committee and found to be medically and psychosocially appropriate for voluntary kidney donation. The patient denies any feelings of being pressured to proceed with kidney donation.  Health events since donor evaluation: None    Special considerations:   Constipation: no  PONV: no  History of Urinary retention? no  Significant neck/back/joint concerns for lateral decubitus positioning?: No  Uatsdin: No    MEDICAL HISTORY:                                                      Patient Active Problem List    Diagnosis Date Noted     Transplant donor evaluation 01/04/2022     Priority: Medium      Past Medical History:   Diagnosis Date     Known health problems: none      Past Surgical History:   Procedure Laterality Date     NO HISTORY OF SURGERY       No current outpatient medications on file.     OTC products: None, except as noted above  No Known Allergies   Social History     Tobacco Use     Smoking status: Never     Smokeless tobacco: Never   Substance Use Topics     Alcohol use: Never     History   Drug Use Unknown       REVIEW OF SYSTEMS:                                                    CONSTITUTIONAL: NEGATIVE for fever, chills, change in weight  INTEGUMENTARY/SKIN: NEGATIVE for worrisome rashes, moles or lesions  EYES: NEGATIVE for vision changes or irritation  ENT/MOUTH: NEGATIVE for ear, mouth and throat problems  RESP: NEGATIVE for significant cough or SOB  CV: NEGATIVE for chest pain, palpitations or peripheral edema  GI: NEGATIVE for nausea, abdominal pain, heartburn, or change in bowel habits  : NEGATIVE for frequency, dysuria, or  "hematuria  MUSCULOSKELETAL: NEGATIVE for significant arthralgias or myalgia  NEURO: NEGATIVE for weakness, dizziness or paresthesias  ENDOCRINE: NEGATIVE for temperature intolerance, skin/hair changes  HEME: NEGATIVE for bleeding problems  PSYCHIATRIC: NEGATIVE for changes in mood or affect    EXAM:                                                    BP (!) 138/91 (BP Location: Right arm, Patient Position: Sitting, Cuff Size: Adult Large)   Pulse 50   Temp 98.1  F (36.7  C) (Oral)   Resp 16   Ht 1.817 m (5' 11.54\")   Wt 93.5 kg (206 lb 1.6 oz)   SpO2 99%   BMI 28.32 kg/m      GENERAL APPEARANCE: healthy, alert and no distress     EYES: EOMI,  PERRL     HENT: ear canals and TM's normal and nose and mouth without ulcers or lesions     NECK: no adenopathy, no asymmetry, masses, or scars and thyroid normal to palpation     RESP: lungs clear to auscultation - no rales, rhonchi or wheezes     CV: regular rates and rhythm, normal S1 S2, no S3 or S4 and no murmur, click or rub     ABDOMEN:  soft, nontender, no HSM or masses and bowel sounds normal     MS: extremities normal- no gross deformities noted, no evidence of inflammation in joints, FROM in all extremities.     SKIN: no suspicious lesions or rashes     NEURO: Normal strength and tone, sensory exam grossly normal, mentation intact and speech normal     PSYCH: mentation appears normal. and affect normal/bright     LYMPHATICS: No cervical adenopathy    DIAGNOSTICS:                                                      EK22 \"Sinus rhythm with sinus arrhythmia\"   Chest Xray-wnl 22  Labs Resulted Today:   Results for orders placed or performed in visit on 23   UA with Microscopic reflex to Culture     Status: Abnormal    Specimen: Urine, Midstream   Result Value Ref Range    Color Urine Light Yellow Colorless, Straw, Light Yellow, Yellow    Appearance Urine Slightly Cloudy (A) Clear    Glucose Urine Negative Negative mg/dL    Bilirubin Urine " Negative Negative    Ketones Urine Negative Negative mg/dL    Specific Gravity Urine 1.025 1.003 - 1.035    Blood Urine Trace (A) Negative    pH Urine 6.0 5.0 - 7.0    Protein Albumin Urine 50 (A) Negative mg/dL    Urobilinogen Urine Normal Normal, 2.0 mg/dL    Nitrite Urine Negative Negative    Leukocyte Esterase Urine Negative Negative    Mucus Urine Present (A) None Seen /LPF    Sperm Urine Present (A) None Seen /HPF    RBC Urine 5 (H) <=2 /HPF    WBC Urine 0 <=5 /HPF    Squamous Epithelials Urine 1 <=1 /HPF    Transitional Epithelials Urine <1 <=1 /HPF    Narrative    Urine Culture not indicated   Hemoglobin     Status: Normal   Result Value Ref Range    Hemoglobin 14.9 13.3 - 17.7 g/dL   Creatinine     Status: Normal   Result Value Ref Range    Creatinine 1.11 0.67 - 1.17 mg/dL    GFR Estimate >90 >60 mL/min/1.73m2   ABO/Rh type and screen     Status: None (In process)    Narrative    The following orders were created for panel order ABO/Rh type and screen.  Procedure                               Abnormality         Status                     ---------                               -----------         ------                     Adult Type and Screen[[id number]]                            In process                   Please view results for these tests on the individual orders.     Recent Labs   Lab Test 03/07/23  0925 05/27/22  1123 05/19/22  0620 01/21/22  0810   HGB 14.9 14.2  --  14.6   PLT  --  194  --  203   INR  --   --   --  1.02   NA  --   --   --  142   POTASSIUM  --   --   --  4.0   CR 1.11  --  1.13 1.03*   A1C  --   --  5.6 5.8*      Assessment:                                                    Healthy voluntary kidney donor    There have been no significant intercurrent medical problems or change of intent in proceeding with kidney donation as scheduled on 3/16/23.    1. Labs reviewed and within normal limits: Yes  2. EKG (1/21/22): Sinus rhythm with sinus arrhythmia   3. Paired Exchange case:  No  4. ABO= A POS  5. Laterality: left  6. Outstanding issues: final COVID screen and CXR pending     Plan:                                                      1. Consent: Not Done  2. Outstanding issues: Final COVID screen and CXR pending     Signed Electronically by: AZUL Miles CNP

## 2023-03-07 NOTE — LETTER
Date: 2023     To:    Whom It May Concern    Re: Griffin Flores    : 2000        Griffin Flores is donating a kidney, the gift of life to his father on 3/16/23 at the [facility].  He will under the care of  Dr. Beatriz Astorga.  He will need time off of school and work starting -. Please accommodate him during this time. If you have any questions please reach out and thank you for supporting rGiffin.    Sincerely,    Nelly Madrid RN Transplant Coordinator

## 2023-03-07 NOTE — NURSING NOTE
"Chief Complaint   Patient presents with     Pre-Op Exam     Day minus kidney donor      Vital signs:  Temp: 98.1  F (36.7  C) Temp src: Oral BP: (!) 138/91 Pulse: 50   Resp: 16 SpO2: 99 %     Height: 181.7 cm (5' 11.54\") Weight: 93.5 kg (206 lb 1.6 oz)  Estimated body mass index is 28.32 kg/m  as calculated from the following:    Height as of this encounter: 1.817 m (5' 11.54\").    Weight as of this encounter: 93.5 kg (206 lb 1.6 oz).      Mesha Allison, [facility]  3/7/2023 9:52 AM      "

## 2023-03-07 NOTE — LETTER
3/7/2023         RE: Griffin Flores  [address] [place] [id number]        Dear Colleague,    Thank you for referring your patient, Griffin Flores, to the [facility]. Please see a copy of my visit note below.    Transplant Surgery Consult Note    Medical record number: [id number]  YOB: 2000,   Consult requested by the patient for evaluation of kidney donation candidacy.    Assessment and Recommendations: Mr. Flores appears to be a good candidate for kidney donation at this point in the evaluation. The following issues will need to be addressed prior to formal review:    Repeat UA since it was not a clean catch. Done today and normal after instruction of clean catch  Consent will be obtained day of surgery.  Laterality discussed with Dr. Cronin after pt left clinic. We will plan on laparoscopic hand-assisted left donor nephrectomy.  Risks discussed.   All questions answered.  Post op course discussed  Pain control discussed    The majority of our visit today was spent in counseling regarding the medical and surgical risks of kidney donation, the typical soila-and post-operative experience and recovery/return to work pattern.  Surgical risks may be transient or permanent and can include but not limited to decreased kidney function or acute kidney failure and the need for dialysis or kidney transplant for the living donor in the immediate post-operative period. We also talked about post-op visits and longer term health care maintenance, as well as the implications of having one remaining kidney. This discussion included, but was not limited to rates of complications such as bleeding, infection, need for transfusion, reoperation, other organ injury, future bowel obstruction, incisional hernia, port site pain, varicocele, venous thrombosis, pulmonary embolism, renal failure, and death (3 per 10,000). The patient understands that if end stage renal failure happens  that dialysis or transplant would be required. For female donor of child bearing age, the risks of preeclampsi or gestational hypertension during pregnancies after donation were discussed .  At the conclusion of the visit, all questions had been answered and Mr. Flores's candidacy for donation will be reviewed at our Multidisciplinary Donor Selection Committee. He will stay in contact with the nurse coordinator with any concerns.      Total time: 60 minutes        Beatriz Astorga MD FACS  Assistant Professor of Surgery  Director, Living Kidney Donor Program.  ---------------------------------------------------------------------------------------------------    HPI: Griffin Flores is a 23 year old year old male who presents for a kidney donor evaluation.  Patient would like to donate to his father.      Personal history of:   No    Yes  Cancer:    [x]      []             Comment:     Diabetes   [x]      []  Comment:    Joselineis   [x]      []  Comment:       Hepatitis   [x]      []  Comment:    Tuberculosis   [x]      []  Comment:   Back or neck pain:  [x]      []  Comment:      Kidney stones   [x]      []  Comment:                  Kidney infections  [x]      []  Comment:           Urinary retention  [x]      []            Comment:   Regular NSAID use:  [x]      []            Comment:      Constipation:   [x]      []            Comment:      Advent  [x]      []            Comment:      Other:    [x]      []            Comment:         Past Medical History:   Diagnosis Date     Known health problems: none      Past Surgical History:   Procedure Laterality Date     NO HISTORY OF SURGERY       Family History   Problem Relation Age of Onset     No Known Problems Mother      Diabetes Father      Kidney failure Father      No Known Problems Sister      No Known Problems Sister      No Known Problems Sister      Unknown/Adopted Maternal Grandmother      Unknown/Adopted Maternal Grandfather      Diabetes  Paternal Grandmother      Unknown/Adopted Paternal Grandfather      Social History     Socioeconomic History     Marital status: Single     Spouse name: Not on file     Number of children: Not on file     Years of education: Not on file     Highest education level: Not on file   Occupational History     Not on file   Tobacco Use     Smoking status: Never     Smokeless tobacco: Never   Substance and Sexual Activity     Alcohol use: Never     Drug use: Never     Sexual activity: Not on file   Other Topics Concern     Not on file   Social History Narrative     Not on file     Social Determinants of Health     Financial Resource Strain: Not on file   Food Insecurity: Not on file   Transportation Needs: Not on file   Physical Activity: Not on file   Stress: Not on file   Social Connections: Not on file   Intimate Partner Violence: Not on file   Housing Stability: Not on file       ROS:   CONSTITUTIONAL:  No fevers or chills  EYES: negative for icterus  ENT:  negative for hearing loss, tinnitus and sore throat  RESPIRATORY:  negative for cough, sputum, dyspnea  CARDIOVASCULAR:  negative for chest pain  GASTROINTESTINAL:  negative for nausea, vomiting, diarrhea or constipation  GENITOURINARY:  negative for incontinence, dysuria, bladder emptying problems  HEME:  No easy bruising  INTEGUMENT:  negative for rash and pruritus  NEURO:  Negative for headache, seizure disorder    Allergies:   No Known Allergies    Medications:      Exam:   Temp:  [98.1  F (36.7  C)] 98.1  F (36.7  C)  Pulse:  [50] 50  Resp:  [16] 16  BP: (138)/(91) 138/91  SpO2:  [99 %] 99 %  There is no height or weight on file to calculate BMI.  Temp:  [98.1  F (36.7  C)] 98.1  F (36.7  C)  Pulse:  [50] 50  Resp:  [16] 16  BP: (138)/(91) 138/91  SpO2:  [99 %] 99 %  Appearance: in no apparent distress.   Skin: normal  Head and Neck: Normal, no rashes or jaundice  Respiratory: normal respiratory excursions, no audible wheeze  Cardiovascular: RRR  Abdomen: flat,  No distention and No surgical scars   Extremeties: Edema, none  Neuro: without deficit       Diagnostics:   Recent Results (from the past 336 hour(s))   Hemoglobin    Collection Time: 03/07/23  9:25 AM   Result Value Ref Range    Hemoglobin 14.9 13.3 - 17.7 g/dL   Creatinine    Collection Time: 03/07/23  9:25 AM   Result Value Ref Range    Creatinine 1.11 0.67 - 1.17 mg/dL    GFR Estimate >90 >60 mL/min/1.73m2   Adult Type and Screen    Collection Time: 03/07/23  9:25 AM   Result Value Ref Range    ABO/RH(D) A POS     Antibody Screen Negative Negative    SPECIMEN EXPIRATION DATE [id number]    UA with Microscopic reflex to Culture    Collection Time: 03/07/23  9:34 AM    Specimen: Urine, Midstream   Result Value Ref Range    Color Urine Light Yellow Colorless, Straw, Light Yellow, Yellow    Appearance Urine Slightly Cloudy (A) Clear    Glucose Urine Negative Negative mg/dL    Bilirubin Urine Negative Negative    Ketones Urine Negative Negative mg/dL    Specific Gravity Urine 1.025 1.003 - 1.035    Blood Urine Trace (A) Negative    pH Urine 6.0 5.0 - 7.0    Protein Albumin Urine 50 (A) Negative mg/dL    Urobilinogen Urine Normal Normal, 2.0 mg/dL    Nitrite Urine Negative Negative    Leukocyte Esterase Urine Negative Negative    Mucus Urine Present (A) None Seen /LPF    Sperm Urine Present (A) None Seen /HPF    RBC Urine 5 (H) <=2 /HPF    WBC Urine 0 <=5 /HPF    Squamous Epithelials Urine 1 <=1 /HPF    Transitional Epithelials Urine <1 <=1 /HPF

## 2023-03-07 NOTE — PROGRESS NOTES
Transplant Surgery H&P                                                        HPI:                                                      Mr. Flores is a 23 year old male who comes to clinic today for preop prior to planned laparoscopic kidney donation surgery. The patient was previously reviewed by the living donor multidisciplinary selection committee and found to be medically and psychosocially appropriate for voluntary kidney donation. The patient denies any feelings of being pressured to proceed with kidney donation.  Health events since donor evaluation: None    Special considerations:   Constipation: no  PONV: no  History of Urinary retention? no  Significant neck/back/joint concerns for lateral decubitus positioning?: No  Baptism: No    MEDICAL HISTORY:                                                      Patient Active Problem List    Diagnosis Date Noted     Transplant donor evaluation 01/04/2022     Priority: Medium      Past Medical History:   Diagnosis Date     Known health problems: none      Past Surgical History:   Procedure Laterality Date     NO HISTORY OF SURGERY       No current outpatient medications on file.     OTC products: None, except as noted above  No Known Allergies   Social History     Tobacco Use     Smoking status: Never     Smokeless tobacco: Never   Substance Use Topics     Alcohol use: Never     History   Drug Use Unknown       REVIEW OF SYSTEMS:                                                    CONSTITUTIONAL: NEGATIVE for fever, chills, change in weight  INTEGUMENTARY/SKIN: NEGATIVE for worrisome rashes, moles or lesions  EYES: NEGATIVE for vision changes or irritation  ENT/MOUTH: NEGATIVE for ear, mouth and throat problems  RESP: NEGATIVE for significant cough or SOB  CV: NEGATIVE for chest pain, palpitations or peripheral edema  GI: NEGATIVE for nausea, abdominal pain, heartburn, or change in bowel habits  : NEGATIVE for frequency, dysuria, or  "hematuria  MUSCULOSKELETAL: NEGATIVE for significant arthralgias or myalgia  NEURO: NEGATIVE for weakness, dizziness or paresthesias  ENDOCRINE: NEGATIVE for temperature intolerance, skin/hair changes  HEME: NEGATIVE for bleeding problems  PSYCHIATRIC: NEGATIVE for changes in mood or affect    EXAM:                                                    BP (!) 138/91 (BP Location: Right arm, Patient Position: Sitting, Cuff Size: Adult Large)   Pulse 50   Temp 98.1  F (36.7  C) (Oral)   Resp 16   Ht 1.817 m (5' 11.54\")   Wt 93.5 kg (206 lb 1.6 oz)   SpO2 99%   BMI 28.32 kg/m      GENERAL APPEARANCE: healthy, alert and no distress     EYES: EOMI,  PERRL     HENT: ear canals and TM's normal and nose and mouth without ulcers or lesions     NECK: no adenopathy, no asymmetry, masses, or scars and thyroid normal to palpation     RESP: lungs clear to auscultation - no rales, rhonchi or wheezes     CV: regular rates and rhythm, normal S1 S2, no S3 or S4 and no murmur, click or rub     ABDOMEN:  soft, nontender, no HSM or masses and bowel sounds normal     MS: extremities normal- no gross deformities noted, no evidence of inflammation in joints, FROM in all extremities.     SKIN: no suspicious lesions or rashes     NEURO: Normal strength and tone, sensory exam grossly normal, mentation intact and speech normal     PSYCH: mentation appears normal. and affect normal/bright     LYMPHATICS: No cervical adenopathy    DIAGNOSTICS:                                                      EK22 \"Sinus rhythm with sinus arrhythmia\"   Chest Xray-wnl 22  Labs Resulted Today:   Results for orders placed or performed in visit on 23   UA with Microscopic reflex to Culture     Status: Abnormal    Specimen: Urine, Midstream   Result Value Ref Range    Color Urine Light Yellow Colorless, Straw, Light Yellow, Yellow    Appearance Urine Slightly Cloudy (A) Clear    Glucose Urine Negative Negative mg/dL    Bilirubin Urine " Negative Negative    Ketones Urine Negative Negative mg/dL    Specific Gravity Urine 1.025 1.003 - 1.035    Blood Urine Trace (A) Negative    pH Urine 6.0 5.0 - 7.0    Protein Albumin Urine 50 (A) Negative mg/dL    Urobilinogen Urine Normal Normal, 2.0 mg/dL    Nitrite Urine Negative Negative    Leukocyte Esterase Urine Negative Negative    Mucus Urine Present (A) None Seen /LPF    Sperm Urine Present (A) None Seen /HPF    RBC Urine 5 (H) <=2 /HPF    WBC Urine 0 <=5 /HPF    Squamous Epithelials Urine 1 <=1 /HPF    Transitional Epithelials Urine <1 <=1 /HPF    Narrative    Urine Culture not indicated   Hemoglobin     Status: Normal   Result Value Ref Range    Hemoglobin 14.9 13.3 - 17.7 g/dL   Creatinine     Status: Normal   Result Value Ref Range    Creatinine 1.11 0.67 - 1.17 mg/dL    GFR Estimate >90 >60 mL/min/1.73m2   ABO/Rh type and screen     Status: None (In process)    Narrative    The following orders were created for panel order ABO/Rh type and screen.  Procedure                               Abnormality         Status                     ---------                               -----------         ------                     Adult Type and Screen[[id number]]                            In process                   Please view results for these tests on the individual orders.     Recent Labs   Lab Test 03/07/23  0925 05/27/22  1123 05/19/22  0620 01/21/22  0810   HGB 14.9 14.2  --  14.6   PLT  --  194  --  203   INR  --   --   --  1.02   NA  --   --   --  142   POTASSIUM  --   --   --  4.0   CR 1.11  --  1.13 1.03*   A1C  --   --  5.6 5.8*      Assessment:                                                    Healthy voluntary kidney donor    There have been no significant intercurrent medical problems or change of intent in proceeding with kidney donation as scheduled on 3/16/23.    1. Labs reviewed and within normal limits: Yes  2. EKG (1/21/22): Sinus rhythm with sinus arrhythmia   3. Paired Exchange case:  No  4. ABO= A POS  5. Laterality: left  6. Outstanding issues: final COVID screen and CXR pending     Plan:                                                      1. Consent: Not Done  2. Outstanding issues: Final COVID screen and CXR pending     Signed Electronically by: AZUL Miles CNP

## 2023-03-07 NOTE — PROGRESS NOTES
Transplant Surgery Consult Note    Medical record number: [id number]  YOB: 2000,   Consult requested by the patient for evaluation of kidney donation candidacy.    Assessment and Recommendations: Mr. Flores appears to be a good candidate for kidney donation at this point in the evaluation. The following issues will need to be addressed prior to formal review:    Repeat UA since it was not a clean catch. Done today and normal after instruction of clean catch  Consent will be obtained day of surgery.  Laterality discussed with Dr. Cronin after pt left clinic. We will plan on laparoscopic hand-assisted left donor nephrectomy.  Risks discussed.   All questions answered.  Post op course discussed  Pain control discussed    The majority of our visit today was spent in counseling regarding the medical and surgical risks of kidney donation, the typical soila-and post-operative experience and recovery/return to work pattern.  Surgical risks may be transient or permanent and can include but not limited to decreased kidney function or acute kidney failure and the need for dialysis or kidney transplant for the living donor in the immediate post-operative period. We also talked about post-op visits and longer term health care maintenance, as well as the implications of having one remaining kidney. This discussion included, but was not limited to rates of complications such as bleeding, infection, need for transfusion, reoperation, other organ injury, future bowel obstruction, incisional hernia, port site pain, varicocele, venous thrombosis, pulmonary embolism, renal failure, and death (3 per 10,000). The patient understands that if end stage renal failure happens that dialysis or transplant would be required. For female donor of child bearing age, the risks of preeclampsi or gestational hypertension during pregnancies after donation were discussed .  At the conclusion of the visit, all questions had been answered  and Mr. Flores's candidacy for donation will be reviewed at our Multidisciplinary Donor Selection Committee. He will stay in contact with the nurse coordinator with any concerns.      Total time: 60 minutes        Beatriz Astorga MD FACS  Assistant Professor of Surgery  Director, Living Kidney Donor Program.  ---------------------------------------------------------------------------------------------------    HPI: Griffin Flores is a 23 year old year old male who presents for a kidney donor evaluation.  Patient would like to donate to his father.      Personal history of:   No    Yes  Cancer:    [x]      []             Comment:     Diabetes   [x]      []  Comment:    Thombosis   [x]      []  Comment:       Hepatitis   [x]      []  Comment:    Tuberculosis   [x]      []  Comment:   Back or neck pain:  [x]      []  Comment:      Kidney stones   [x]      []  Comment:                  Kidney infections  [x]      []  Comment:           Urinary retention  [x]      []            Comment:   Regular NSAID use:  [x]      []            Comment:      Constipation:   [x]      []            Comment:      Jewish  [x]      []            Comment:      Other:    [x]      []            Comment:         Past Medical History:   Diagnosis Date     Known health problems: none      Past Surgical History:   Procedure Laterality Date     NO HISTORY OF SURGERY       Family History   Problem Relation Age of Onset     No Known Problems Mother      Diabetes Father      Kidney failure Father      No Known Problems Sister      No Known Problems Sister      No Known Problems Sister      Unknown/Adopted Maternal Grandmother      Unknown/Adopted Maternal Grandfather      Diabetes Paternal Grandmother      Unknown/Adopted Paternal Grandfather      Social History     Socioeconomic History     Marital status: Single     Spouse name: Not on file     Number of children: Not on file     Years of education: Not on file     Highest  education level: Not on file   Occupational History     Not on file   Tobacco Use     Smoking status: Never     Smokeless tobacco: Never   Substance and Sexual Activity     Alcohol use: Never     Drug use: Never     Sexual activity: Not on file   Other Topics Concern     Not on file   Social History Narrative     Not on file     Social Determinants of Health     Financial Resource Strain: Not on file   Food Insecurity: Not on file   Transportation Needs: Not on file   Physical Activity: Not on file   Stress: Not on file   Social Connections: Not on file   Intimate Partner Violence: Not on file   Housing Stability: Not on file       ROS:   CONSTITUTIONAL:  No fevers or chills  EYES: negative for icterus  ENT:  negative for hearing loss, tinnitus and sore throat  RESPIRATORY:  negative for cough, sputum, dyspnea  CARDIOVASCULAR:  negative for chest pain  GASTROINTESTINAL:  negative for nausea, vomiting, diarrhea or constipation  GENITOURINARY:  negative for incontinence, dysuria, bladder emptying problems  HEME:  No easy bruising  INTEGUMENT:  negative for rash and pruritus  NEURO:  Negative for headache, seizure disorder    Allergies:   No Known Allergies    Medications:      Exam:   Temp:  [98.1  F (36.7  C)] 98.1  F (36.7  C)  Pulse:  [50] 50  Resp:  [16] 16  BP: (138)/(91) 138/91  SpO2:  [99 %] 99 %  There is no height or weight on file to calculate BMI.  Temp:  [98.1  F (36.7  C)] 98.1  F (36.7  C)  Pulse:  [50] 50  Resp:  [16] 16  BP: (138)/(91) 138/91  SpO2:  [99 %] 99 %  Appearance: in no apparent distress.   Skin: normal  Head and Neck: Normal, no rashes or jaundice  Respiratory: normal respiratory excursions, no audible wheeze  Cardiovascular: RRR  Abdomen: flat, No distention and No surgical scars   Extremeties: Edema, none  Neuro: without deficit       Diagnostics:   Recent Results (from the past 336 hour(s))   Hemoglobin    Collection Time: 03/07/23  9:25 AM   Result Value Ref Range    Hemoglobin 14.9  13.3 - 17.7 g/dL   Creatinine    Collection Time: 03/07/23  9:25 AM   Result Value Ref Range    Creatinine 1.11 0.67 - 1.17 mg/dL    GFR Estimate >90 >60 mL/min/1.73m2   Adult Type and Screen    Collection Time: 03/07/23  9:25 AM   Result Value Ref Range    ABO/RH(D) A POS     Antibody Screen Negative Negative    SPECIMEN EXPIRATION DATE [id number]    UA with Microscopic reflex to Culture    Collection Time: 03/07/23  9:34 AM    Specimen: Urine, Midstream   Result Value Ref Range    Color Urine Light Yellow Colorless, Straw, Light Yellow, Yellow    Appearance Urine Slightly Cloudy (A) Clear    Glucose Urine Negative Negative mg/dL    Bilirubin Urine Negative Negative    Ketones Urine Negative Negative mg/dL    Specific Gravity Urine 1.025 1.003 - 1.035    Blood Urine Trace (A) Negative    pH Urine 6.0 5.0 - 7.0    Protein Albumin Urine 50 (A) Negative mg/dL    Urobilinogen Urine Normal Normal, 2.0 mg/dL    Nitrite Urine Negative Negative    Leukocyte Esterase Urine Negative Negative    Mucus Urine Present (A) None Seen /LPF    Sperm Urine Present (A) None Seen /HPF    RBC Urine 5 (H) <=2 /HPF    WBC Urine 0 <=5 /HPF    Squamous Epithelials Urine 1 <=1 /HPF    Transitional Epithelials Urine <1 <=1 /HPF

## 2023-03-08 LAB
EBV VCA IGM SER IA-ACNC: <10 U/ML
EBV VCA IGM SER IA-ACNC: NORMAL

## 2023-03-09 LAB
BACTERIA UR CULT: NO GROWTH
HBV DNA SERPL QL NAA+PROBE: NORMAL
HCV RNA SERPL QL NAA+PROBE: NORMAL
HIV1+2 RNA SERPL QL NAA+PROBE: NORMAL
WNV RNA SERPL DONR QL NAA+PROBE: NORMAL

## 2023-03-15 ENCOUNTER — ANCILLARY PROCEDURE (OUTPATIENT)
Dept: GENERAL RADIOLOGY | Facility: CLINIC | Age: 23
End: 2023-03-15

## 2023-03-15 ENCOUNTER — LAB (OUTPATIENT)
Dept: LAB | Facility: CLINIC | Age: 23
End: 2023-03-15

## 2023-03-15 ENCOUNTER — ANESTHESIA EVENT (OUTPATIENT)
Dept: SURGERY | Facility: CLINIC | Age: 23
End: 2023-03-15

## 2023-03-15 DIAGNOSIS — Z52.4 ENCOUNTER FOR DONATION OF KIDNEY: ICD-10-CM

## 2023-03-15 LAB — SARS-COV-2 RNA RESP QL NAA+PROBE: NEGATIVE

## 2023-03-15 PROCEDURE — U0003 INFECTIOUS AGENT DETECTION BY NUCLEIC ACID (DNA OR RNA); SEVERE ACUTE RESPIRATORY SYNDROME CORONAVIRUS 2 (SARS-COV-2) (CORONAVIRUS DISEASE [COVID-19]), AMPLIFIED PROBE TECHNIQUE, MAKING USE OF HIGH THROUGHPUT TECHNOLOGIES AS DESCRIBED BY CMS-2020-01-R: HCPCS | Mod: 90 | Performed by: PATHOLOGY

## 2023-03-15 PROCEDURE — U0005 INFEC AGEN DETEC AMPLI PROBE: HCPCS | Mod: 90 | Performed by: PATHOLOGY

## 2023-03-15 PROCEDURE — 71046 X-RAY EXAM CHEST 2 VIEWS: CPT | Performed by: RADIOLOGY

## 2023-03-15 PROCEDURE — 99000 SPECIMEN HANDLING OFFICE-LAB: CPT | Performed by: PATHOLOGY

## 2023-03-15 RX ORDER — ONDANSETRON 2 MG/ML
4 INJECTION INTRAMUSCULAR; INTRAVENOUS ONCE
Status: CANCELLED | OUTPATIENT
Start: 2023-03-15 | End: 2023-03-15

## 2023-03-15 NOTE — ANESTHESIA PREPROCEDURE EVALUATION
Anesthesia Pre-Procedure Evaluation    Patient: Griffin Flores   MRN: [id number] : 2000        Procedure : Procedure(s):  Laparoscopic Hand Assisted Living Related Left Kidney Donor          Past Medical History:   Diagnosis Date     Known health problems: none       Past Surgical History:   Procedure Laterality Date     NO HISTORY OF SURGERY        No Known Allergies   Social History     Tobacco Use     Smoking status: Never     Smokeless tobacco: Never   Substance Use Topics     Alcohol use: Never      Wt Readings from Last 1 Encounters:   23 93.5 kg (206 lb 1.6 oz)        Anesthesia Evaluation   Pt has not had prior anesthetic         ROS/MED HX  ENT/Pulmonary:  - neg pulmonary ROS     Neurologic:  - neg neurologic ROS     Cardiovascular:  - neg cardiovascular ROS     METS/Exercise Tolerance:     Hematologic:  - neg hematologic  ROS     Musculoskeletal:  - neg musculoskeletal ROS     GI/Hepatic:  - neg GI/hepatic ROS     Renal/Genitourinary:  - neg Renal ROS     Endo:  - neg endo ROS     Psychiatric/Substance Use:       Infectious Disease:  - neg infectious disease ROS     Malignancy:  - neg malignancy ROS     Other:            Physical Exam    Airway        Mallampati: II   TM distance: > 3 FB   Neck ROM: full   Mouth opening: > 3 cm    Respiratory Devices and Support         Dental       (+) Minor Abnormalities - some fillings, tiny chips      Cardiovascular   cardiovascular exam normal          Pulmonary   pulmonary exam normal                OUTSIDE LABS:  CBC:   Lab Results   Component Value Date    WBC 4.8 2022    WBC 5.1 2022    HGB 14.9 2023    HGB 14.2 2022    HCT 47.5 2022    HCT 49.1 2022     2022     2022     BMP:   Lab Results   Component Value Date     2022    POTASSIUM 4.0 2022    CHLORIDE 106 2022    CO2 30 2022    BUN 12 2022    CR 1.11 2023    CR 1.13 2022     (H)  05/19/2022    GLC 90 01/21/2022     COAGS:   Lab Results   Component Value Date    PTT 33 01/21/2022    INR 1.02 01/21/2022     POC: No results found for: BGM, HCG, HCGS  HEPATIC:   Lab Results   Component Value Date    ALBUMIN 4.2 01/21/2022    PROTTOTAL 7.9 01/21/2022    ALT 44 01/21/2022    AST 14 01/21/2022    ALKPHOS 36 (L) 01/21/2022    BILITOTAL 0.3 01/21/2022     OTHER:   Lab Results   Component Value Date    A1C 5.6 05/19/2022    ROSALINA 9.4 01/21/2022    PHOS 4.4 01/21/2022       Anesthesia Plan    ASA Status:  1   NPO Status:  NPO Appropriate    Anesthesia Type: General.     - Airway: ETT   Induction: Intravenous, Propofol.   Maintenance: Balanced.   Techniques and Equipment:     - Lines/Monitors: 2nd IV     Consents    Anesthesia Plan(s) and associated risks, benefits, and realistic alternatives discussed. Questions answered and patient/representative(s) expressed understanding.    - Discussed:     - Discussed with:  Patient      - Extended Intubation/Ventilatory Support Discussed: No.      - Patient is DNR/DNI Status: No    Use of blood products discussed: Yes.     - Discussed with: Patient.     - Consented: consented to blood products            Reason for refusal: other.     Postoperative Care    Pain management: IV analgesics, Peripheral nerve block (Single Shot), Multi-modal analgesia.   PONV prophylaxis: Ondansetron (or other 5HT-3), Dexamethasone or Solumedrol     Comments:                Jermain Jin MD

## 2023-03-16 ENCOUNTER — ANESTHESIA (OUTPATIENT)
Dept: SURGERY | Facility: CLINIC | Age: 23
End: 2023-03-16

## 2023-03-16 ENCOUNTER — HOSPITAL ENCOUNTER (INPATIENT)
Facility: CLINIC | Age: 23
LOS: 1 days | Discharge: HOME | DRG: 661 | End: 2023-03-17
Attending: SURGERY | Admitting: SURGERY

## 2023-03-16 DIAGNOSIS — G89.18 ACUTE POST-OPERATIVE PAIN: ICD-10-CM

## 2023-03-16 DIAGNOSIS — Z00.5 TRANSPLANT DONOR EVALUATION: Primary | ICD-10-CM

## 2023-03-16 DIAGNOSIS — Z52.4 ENCOUNTER FOR DONATION OF KIDNEY: ICD-10-CM

## 2023-03-16 LAB
CK SERPL-CCNC: 105 U/L (ref 39–308)
ERYTHROCYTE [DISTWIDTH] IN BLOOD BY AUTOMATED COUNT: 15.1 % (ref 10–15)
GLUCOSE BLDC GLUCOMTR-MCNC: 105 MG/DL (ref 70–99)
HCT VFR BLD AUTO: 43.4 % (ref 40–53)
HGB BLD-MCNC: 13.1 G/DL (ref 13.3–17.7)
HOLD SPECIMEN: NORMAL
MCH RBC QN AUTO: 20.6 PG (ref 26.5–33)
MCHC RBC AUTO-ENTMCNC: 30.2 G/DL (ref 31.5–36.5)
MCV RBC AUTO: 68 FL (ref 78–100)
PLATELET # BLD AUTO: 156 10E3/UL (ref 150–450)
RBC # BLD AUTO: 6.37 10E6/UL (ref 4.4–5.9)
WBC # BLD AUTO: 13.9 10E3/UL (ref 4–11)

## 2023-03-16 PROCEDURE — 250N000011 HC RX IP 250 OP 636

## 2023-03-16 PROCEDURE — 50547 LAPARO REMOVAL DONOR KIDNEY: CPT | Performed by: SURGERY

## 2023-03-16 PROCEDURE — 82550 ASSAY OF CK (CPK): CPT | Performed by: NURSE PRACTITIONER

## 2023-03-16 PROCEDURE — 250N000011 HC RX IP 250 OP 636: Performed by: STUDENT IN AN ORGANIZED HEALTH CARE EDUCATION/TRAINING PROGRAM

## 2023-03-16 PROCEDURE — 120N000011 HC R&B TRANSPLANT UMMC

## 2023-03-16 PROCEDURE — 250N000011 HC RX IP 250 OP 636: Performed by: NURSE PRACTITIONER

## 2023-03-16 PROCEDURE — C9290 INJ, BUPIVACAINE LIPOSOME: HCPCS

## 2023-03-16 PROCEDURE — 272N000001 HC OR GENERAL SUPPLY STERILE: Performed by: SURGERY

## 2023-03-16 PROCEDURE — 999N000141 HC STATISTIC PRE-PROCEDURE NURSING ASSESSMENT: Performed by: SURGERY

## 2023-03-16 PROCEDURE — 250N000013 HC RX MED GY IP 250 OP 250 PS 637

## 2023-03-16 PROCEDURE — 710N000010 HC RECOVERY PHASE 1, LEVEL 2, PER MIN: Performed by: SURGERY

## 2023-03-16 PROCEDURE — 88240 CELL CRYOPRESERVE/STORAGE: CPT | Performed by: NURSE PRACTITIONER

## 2023-03-16 PROCEDURE — 36415 COLL VENOUS BLD VENIPUNCTURE: CPT | Performed by: NURSE PRACTITIONER

## 2023-03-16 PROCEDURE — 258N000003 HC RX IP 258 OP 636: Performed by: NURSE ANESTHETIST, CERTIFIED REGISTERED

## 2023-03-16 PROCEDURE — 360N000077 HC SURGERY LEVEL 4, PER MIN: Performed by: SURGERY

## 2023-03-16 PROCEDURE — 250N000011 HC RX IP 250 OP 636: Performed by: NURSE ANESTHETIST, CERTIFIED REGISTERED

## 2023-03-16 PROCEDURE — 0TT14ZZ RESECTION OF LEFT KIDNEY, PERCUTANEOUS ENDOSCOPIC APPROACH: ICD-10-PCS | Performed by: SURGERY

## 2023-03-16 PROCEDURE — 250N000013 HC RX MED GY IP 250 OP 250 PS 637: Performed by: NURSE PRACTITIONER

## 2023-03-16 PROCEDURE — 250N000025 HC SEVOFLURANE, PER MIN: Performed by: SURGERY

## 2023-03-16 PROCEDURE — 250N000009 HC RX 250: Performed by: NURSE ANESTHETIST, CERTIFIED REGISTERED

## 2023-03-16 PROCEDURE — 370N000017 HC ANESTHESIA TECHNICAL FEE, PER MIN: Performed by: SURGERY

## 2023-03-16 PROCEDURE — 85027 COMPLETE CBC AUTOMATED: CPT | Performed by: NURSE PRACTITIONER

## 2023-03-16 PROCEDURE — 250N000011 HC RX IP 250 OP 636: Performed by: SURGERY

## 2023-03-16 RX ORDER — PROCHLORPERAZINE MALEATE 5 MG
10 TABLET ORAL EVERY 6 HOURS PRN
Status: DISCONTINUED | OUTPATIENT
Start: 2023-03-16 | End: 2023-03-17 | Stop reason: HOSPADM

## 2023-03-16 RX ORDER — OXYCODONE HYDROCHLORIDE 5 MG/1
5-10 TABLET ORAL
Status: DISCONTINUED | OUTPATIENT
Start: 2023-03-16 | End: 2023-03-17 | Stop reason: HOSPADM

## 2023-03-16 RX ORDER — HYDROMORPHONE HCL IN WATER/PF 6 MG/30 ML
0.4 PATIENT CONTROLLED ANALGESIA SYRINGE INTRAVENOUS EVERY 5 MIN PRN
Status: DISCONTINUED | OUTPATIENT
Start: 2023-03-16 | End: 2023-03-16 | Stop reason: HOSPADM

## 2023-03-16 RX ORDER — ONDANSETRON 4 MG/1
4 TABLET, ORALLY DISINTEGRATING ORAL EVERY 6 HOURS PRN
Status: DISCONTINUED | OUTPATIENT
Start: 2023-03-16 | End: 2023-03-17 | Stop reason: HOSPADM

## 2023-03-16 RX ORDER — DIPHENHYDRAMINE HCL 25 MG
25 CAPSULE ORAL EVERY 6 HOURS PRN
Status: DISCONTINUED | OUTPATIENT
Start: 2023-03-16 | End: 2023-03-16 | Stop reason: HOSPADM

## 2023-03-16 RX ORDER — LIDOCAINE HYDROCHLORIDE 20 MG/ML
INJECTION, SOLUTION INFILTRATION; PERINEURAL PRN
Status: DISCONTINUED | OUTPATIENT
Start: 2023-03-16 | End: 2023-03-16

## 2023-03-16 RX ORDER — ONDANSETRON 2 MG/ML
4 INJECTION INTRAMUSCULAR; INTRAVENOUS EVERY 6 HOURS PRN
Status: DISCONTINUED | OUTPATIENT
Start: 2023-03-16 | End: 2023-03-17 | Stop reason: HOSPADM

## 2023-03-16 RX ORDER — NALOXONE HYDROCHLORIDE 0.4 MG/ML
0.4 INJECTION, SOLUTION INTRAMUSCULAR; INTRAVENOUS; SUBCUTANEOUS
Status: DISCONTINUED | OUTPATIENT
Start: 2023-03-16 | End: 2023-03-16 | Stop reason: HOSPADM

## 2023-03-16 RX ORDER — METHOCARBAMOL 500 MG/1
500 TABLET, FILM COATED ORAL 4 TIMES DAILY PRN
Status: DISCONTINUED | OUTPATIENT
Start: 2023-03-16 | End: 2023-03-17 | Stop reason: HOSPADM

## 2023-03-16 RX ORDER — DIAZEPAM 10 MG/2ML
2.5 INJECTION, SOLUTION INTRAMUSCULAR; INTRAVENOUS
Status: DISCONTINUED | OUTPATIENT
Start: 2023-03-16 | End: 2023-03-16 | Stop reason: HOSPADM

## 2023-03-16 RX ORDER — FENTANYL CITRATE 50 UG/ML
25 INJECTION, SOLUTION INTRAMUSCULAR; INTRAVENOUS EVERY 5 MIN PRN
Status: DISCONTINUED | OUTPATIENT
Start: 2023-03-16 | End: 2023-03-16 | Stop reason: HOSPADM

## 2023-03-16 RX ORDER — NALOXONE HYDROCHLORIDE 0.4 MG/ML
0.4 INJECTION, SOLUTION INTRAMUSCULAR; INTRAVENOUS; SUBCUTANEOUS
Status: DISCONTINUED | OUTPATIENT
Start: 2023-03-16 | End: 2023-03-17 | Stop reason: HOSPADM

## 2023-03-16 RX ORDER — PROCHLORPERAZINE MALEATE 5 MG
10 TABLET ORAL EVERY 6 HOURS PRN
Status: DISCONTINUED | OUTPATIENT
Start: 2023-03-16 | End: 2023-03-16 | Stop reason: HOSPADM

## 2023-03-16 RX ORDER — HEPARIN SODIUM 1000 [USP'U]/ML
INJECTION, SOLUTION INTRAVENOUS; SUBCUTANEOUS PRN
Status: DISCONTINUED | OUTPATIENT
Start: 2023-03-16 | End: 2023-03-16

## 2023-03-16 RX ORDER — DEXAMETHASONE SODIUM PHOSPHATE 4 MG/ML
INJECTION, SOLUTION INTRA-ARTICULAR; INTRALESIONAL; INTRAMUSCULAR; INTRAVENOUS; SOFT TISSUE PRN
Status: DISCONTINUED | OUTPATIENT
Start: 2023-03-16 | End: 2023-03-16

## 2023-03-16 RX ORDER — NALOXONE HYDROCHLORIDE 0.4 MG/ML
0.2 INJECTION, SOLUTION INTRAMUSCULAR; INTRAVENOUS; SUBCUTANEOUS
Status: DISCONTINUED | OUTPATIENT
Start: 2023-03-16 | End: 2023-03-17 | Stop reason: HOSPADM

## 2023-03-16 RX ORDER — AMOXICILLIN 250 MG
2 CAPSULE ORAL 2 TIMES DAILY
Status: DISCONTINUED | OUTPATIENT
Start: 2023-03-16 | End: 2023-03-17 | Stop reason: HOSPADM

## 2023-03-16 RX ORDER — BUPIVACAINE HYDROCHLORIDE 2.5 MG/ML
INJECTION, SOLUTION EPIDURAL; INFILTRATION; INTRACAUDAL PRN
Status: DISCONTINUED | OUTPATIENT
Start: 2023-03-16 | End: 2023-03-16 | Stop reason: HOSPADM

## 2023-03-16 RX ORDER — ACETAMINOPHEN 325 MG/1
975 TABLET ORAL ONCE
Status: COMPLETED | OUTPATIENT
Start: 2023-03-16 | End: 2023-03-16

## 2023-03-16 RX ORDER — ONDANSETRON 2 MG/ML
INJECTION INTRAMUSCULAR; INTRAVENOUS PRN
Status: DISCONTINUED | OUTPATIENT
Start: 2023-03-16 | End: 2023-03-16

## 2023-03-16 RX ORDER — SODIUM CHLORIDE, SODIUM LACTATE, POTASSIUM CHLORIDE, CALCIUM CHLORIDE 600; 310; 30; 20 MG/100ML; MG/100ML; MG/100ML; MG/100ML
1-3 INJECTION, SOLUTION INTRAVENOUS CONTINUOUS
Status: DISCONTINUED | OUTPATIENT
Start: 2023-03-16 | End: 2023-03-16 | Stop reason: HOSPADM

## 2023-03-16 RX ORDER — FUROSEMIDE 10 MG/ML
INJECTION INTRAMUSCULAR; INTRAVENOUS PRN
Status: DISCONTINUED | OUTPATIENT
Start: 2023-03-16 | End: 2023-03-16

## 2023-03-16 RX ORDER — DEXTROSE, SODIUM CHLORIDE, SODIUM LACTATE, POTASSIUM CHLORIDE, AND CALCIUM CHLORIDE 5; .6; .31; .03; .02 G/100ML; G/100ML; G/100ML; G/100ML; G/100ML
INJECTION, SOLUTION INTRAVENOUS CONTINUOUS
Status: DISCONTINUED | OUTPATIENT
Start: 2023-03-16 | End: 2023-03-17

## 2023-03-16 RX ORDER — FENTANYL CITRATE 50 UG/ML
INJECTION, SOLUTION INTRAMUSCULAR; INTRAVENOUS PRN
Status: DISCONTINUED | OUTPATIENT
Start: 2023-03-16 | End: 2023-03-16

## 2023-03-16 RX ORDER — KETOROLAC TROMETHAMINE 30 MG/ML
15 INJECTION, SOLUTION INTRAMUSCULAR; INTRAVENOUS ONCE
Status: DISCONTINUED | OUTPATIENT
Start: 2023-03-16 | End: 2023-03-16 | Stop reason: HOSPADM

## 2023-03-16 RX ORDER — SODIUM CHLORIDE, SODIUM LACTATE, POTASSIUM CHLORIDE, CALCIUM CHLORIDE 600; 310; 30; 20 MG/100ML; MG/100ML; MG/100ML; MG/100ML
INJECTION, SOLUTION INTRAVENOUS CONTINUOUS
Status: DISCONTINUED | OUTPATIENT
Start: 2023-03-16 | End: 2023-03-16 | Stop reason: HOSPADM

## 2023-03-16 RX ORDER — AMOXICILLIN 250 MG
1 CAPSULE ORAL 2 TIMES DAILY
Status: DISCONTINUED | OUTPATIENT
Start: 2023-03-16 | End: 2023-03-17 | Stop reason: HOSPADM

## 2023-03-16 RX ORDER — CEFUROXIME SODIUM 1.5 G/16ML
1.5 INJECTION, POWDER, FOR SOLUTION INTRAVENOUS EVERY 4 HOURS PRN
Status: DISCONTINUED | OUTPATIENT
Start: 2023-03-16 | End: 2023-03-16 | Stop reason: HOSPADM

## 2023-03-16 RX ORDER — FAMOTIDINE 20 MG/1
20 TABLET, FILM COATED ORAL DAILY
Status: DISCONTINUED | OUTPATIENT
Start: 2023-03-16 | End: 2023-03-17 | Stop reason: HOSPADM

## 2023-03-16 RX ORDER — ACETAMINOPHEN 325 MG/1
650 TABLET ORAL EVERY 6 HOURS
Status: DISCONTINUED | OUTPATIENT
Start: 2023-03-16 | End: 2023-03-17 | Stop reason: HOSPADM

## 2023-03-16 RX ORDER — NALOXONE HYDROCHLORIDE 0.4 MG/ML
0.2 INJECTION, SOLUTION INTRAMUSCULAR; INTRAVENOUS; SUBCUTANEOUS
Status: DISCONTINUED | OUTPATIENT
Start: 2023-03-16 | End: 2023-03-16 | Stop reason: HOSPADM

## 2023-03-16 RX ORDER — FLUMAZENIL 0.1 MG/ML
0.2 INJECTION, SOLUTION INTRAVENOUS
Status: DISCONTINUED | OUTPATIENT
Start: 2023-03-16 | End: 2023-03-16 | Stop reason: HOSPADM

## 2023-03-16 RX ORDER — DIPHENHYDRAMINE HYDROCHLORIDE 50 MG/ML
25 INJECTION INTRAMUSCULAR; INTRAVENOUS EVERY 6 HOURS PRN
Status: DISCONTINUED | OUTPATIENT
Start: 2023-03-16 | End: 2023-03-16 | Stop reason: HOSPADM

## 2023-03-16 RX ORDER — KETOROLAC TROMETHAMINE 15 MG/ML
10 INJECTION, SOLUTION INTRAMUSCULAR; INTRAVENOUS EVERY 8 HOURS
Status: DISCONTINUED | OUTPATIENT
Start: 2023-03-16 | End: 2023-03-17 | Stop reason: HOSPADM

## 2023-03-16 RX ORDER — HALOPERIDOL 5 MG/ML
1 INJECTION INTRAMUSCULAR
Status: DISCONTINUED | OUTPATIENT
Start: 2023-03-16 | End: 2023-03-16 | Stop reason: HOSPADM

## 2023-03-16 RX ORDER — MANNITOL 250 MG/ML
INJECTION, SOLUTION INTRAVENOUS PRN
Status: DISCONTINUED | OUTPATIENT
Start: 2023-03-16 | End: 2023-03-16

## 2023-03-16 RX ORDER — HEPARIN SODIUM 5000 [USP'U]/.5ML
5000 INJECTION, SOLUTION INTRAVENOUS; SUBCUTANEOUS 3 TIMES DAILY
Status: DISCONTINUED | OUTPATIENT
Start: 2023-03-17 | End: 2023-03-17 | Stop reason: HOSPADM

## 2023-03-16 RX ORDER — MAGNESIUM SULFATE HEPTAHYDRATE 40 MG/ML
2 INJECTION, SOLUTION INTRAVENOUS ONCE
Status: COMPLETED | OUTPATIENT
Start: 2023-03-16 | End: 2023-03-16

## 2023-03-16 RX ORDER — PROPOFOL 10 MG/ML
INJECTION, EMULSION INTRAVENOUS PRN
Status: DISCONTINUED | OUTPATIENT
Start: 2023-03-16 | End: 2023-03-16

## 2023-03-16 RX ORDER — ONDANSETRON 2 MG/ML
4 INJECTION INTRAMUSCULAR; INTRAVENOUS ONCE
Status: COMPLETED | OUTPATIENT
Start: 2023-03-16 | End: 2023-03-16

## 2023-03-16 RX ORDER — HYDRALAZINE HYDROCHLORIDE 20 MG/ML
2.5-5 INJECTION INTRAMUSCULAR; INTRAVENOUS EVERY 10 MIN PRN
Status: DISCONTINUED | OUTPATIENT
Start: 2023-03-16 | End: 2023-03-16 | Stop reason: HOSPADM

## 2023-03-16 RX ORDER — ONDANSETRON 4 MG/1
4 TABLET, ORALLY DISINTEGRATING ORAL EVERY 6 HOURS PRN
Status: DISCONTINUED | OUTPATIENT
Start: 2023-03-16 | End: 2023-03-16 | Stop reason: HOSPADM

## 2023-03-16 RX ORDER — DEXAMETHASONE SODIUM PHOSPHATE 4 MG/ML
8 INJECTION, SOLUTION INTRA-ARTICULAR; INTRALESIONAL; INTRAMUSCULAR; INTRAVENOUS; SOFT TISSUE ONCE
Status: DISCONTINUED | OUTPATIENT
Start: 2023-03-16 | End: 2023-03-16 | Stop reason: HOSPADM

## 2023-03-16 RX ORDER — ONDANSETRON 2 MG/ML
4 INJECTION INTRAMUSCULAR; INTRAVENOUS EVERY 6 HOURS PRN
Status: DISCONTINUED | OUTPATIENT
Start: 2023-03-16 | End: 2023-03-16 | Stop reason: HOSPADM

## 2023-03-16 RX ORDER — BUPIVACAINE HYDROCHLORIDE 2.5 MG/ML
INJECTION, SOLUTION EPIDURAL; INFILTRATION; INTRACAUDAL
Status: COMPLETED | OUTPATIENT
Start: 2023-03-16 | End: 2023-03-16

## 2023-03-16 RX ORDER — CEFUROXIME SODIUM 1.5 G/16ML
1.5 INJECTION, POWDER, FOR SOLUTION INTRAVENOUS
Status: COMPLETED | OUTPATIENT
Start: 2023-03-16 | End: 2023-03-16

## 2023-03-16 RX ORDER — FENTANYL CITRATE 50 UG/ML
25-50 INJECTION, SOLUTION INTRAMUSCULAR; INTRAVENOUS
Status: DISCONTINUED | OUTPATIENT
Start: 2023-03-16 | End: 2023-03-16 | Stop reason: HOSPADM

## 2023-03-16 RX ORDER — PROTAMINE SULFATE 10 MG/ML
50 INJECTION, SOLUTION INTRAVENOUS ONCE
Status: COMPLETED | OUTPATIENT
Start: 2023-03-16 | End: 2023-03-16

## 2023-03-16 RX ORDER — FENTANYL CITRATE 50 UG/ML
50 INJECTION, SOLUTION INTRAMUSCULAR; INTRAVENOUS EVERY 5 MIN PRN
Status: DISCONTINUED | OUTPATIENT
Start: 2023-03-16 | End: 2023-03-16 | Stop reason: HOSPADM

## 2023-03-16 RX ORDER — LABETALOL HYDROCHLORIDE 5 MG/ML
10 INJECTION, SOLUTION INTRAVENOUS
Status: DISCONTINUED | OUTPATIENT
Start: 2023-03-16 | End: 2023-03-16 | Stop reason: HOSPADM

## 2023-03-16 RX ORDER — ONDANSETRON 4 MG/1
4 TABLET, ORALLY DISINTEGRATING ORAL EVERY 30 MIN PRN
Status: DISCONTINUED | OUTPATIENT
Start: 2023-03-16 | End: 2023-03-16 | Stop reason: HOSPADM

## 2023-03-16 RX ORDER — GABAPENTIN 300 MG/1
300 CAPSULE ORAL ONCE
Status: COMPLETED | OUTPATIENT
Start: 2023-03-16 | End: 2023-03-16

## 2023-03-16 RX ORDER — MEPERIDINE HYDROCHLORIDE 25 MG/ML
12.5 INJECTION INTRAMUSCULAR; INTRAVENOUS; SUBCUTANEOUS EVERY 5 MIN PRN
Status: DISCONTINUED | OUTPATIENT
Start: 2023-03-16 | End: 2023-03-16 | Stop reason: HOSPADM

## 2023-03-16 RX ORDER — HEPARIN SODIUM 5000 [USP'U]/.5ML
5000 INJECTION, SOLUTION INTRAVENOUS; SUBCUTANEOUS ONCE
Status: COMPLETED | OUTPATIENT
Start: 2023-03-16 | End: 2023-03-16

## 2023-03-16 RX ORDER — HYDROMORPHONE HCL IN WATER/PF 6 MG/30 ML
0.2 PATIENT CONTROLLED ANALGESIA SYRINGE INTRAVENOUS EVERY 5 MIN PRN
Status: DISCONTINUED | OUTPATIENT
Start: 2023-03-16 | End: 2023-03-16 | Stop reason: HOSPADM

## 2023-03-16 RX ORDER — BISACODYL 10 MG
10 SUPPOSITORY, RECTAL RECTAL DAILY
Status: DISCONTINUED | OUTPATIENT
Start: 2023-03-17 | End: 2023-03-17 | Stop reason: HOSPADM

## 2023-03-16 RX ORDER — KETOROLAC TROMETHAMINE 30 MG/ML
INJECTION, SOLUTION INTRAMUSCULAR; INTRAVENOUS PRN
Status: DISCONTINUED | OUTPATIENT
Start: 2023-03-16 | End: 2023-03-16

## 2023-03-16 RX ORDER — SODIUM CHLORIDE, SODIUM LACTATE, POTASSIUM CHLORIDE, CALCIUM CHLORIDE 600; 310; 30; 20 MG/100ML; MG/100ML; MG/100ML; MG/100ML
INJECTION, SOLUTION INTRAVENOUS CONTINUOUS PRN
Status: DISCONTINUED | OUTPATIENT
Start: 2023-03-16 | End: 2023-03-16

## 2023-03-16 RX ORDER — HEPARIN SODIUM 1000 [USP'U]/ML
5000 INJECTION, SOLUTION INTRAVENOUS; SUBCUTANEOUS ONCE
Status: DISCONTINUED | OUTPATIENT
Start: 2023-03-16 | End: 2023-03-16 | Stop reason: HOSPADM

## 2023-03-16 RX ORDER — ONDANSETRON 2 MG/ML
4 INJECTION INTRAMUSCULAR; INTRAVENOUS EVERY 30 MIN PRN
Status: DISCONTINUED | OUTPATIENT
Start: 2023-03-16 | End: 2023-03-16 | Stop reason: HOSPADM

## 2023-03-16 RX ADMIN — SODIUM CHLORIDE, POTASSIUM CHLORIDE, SODIUM LACTATE AND CALCIUM CHLORIDE: 600; 310; 30; 20 INJECTION, SOLUTION INTRAVENOUS at 07:43

## 2023-03-16 RX ADMIN — ACETAMINOPHEN 650 MG: 325 TABLET ORAL at 15:56

## 2023-03-16 RX ADMIN — GABAPENTIN 300 MG: 300 CAPSULE ORAL at 05:56

## 2023-03-16 RX ADMIN — SODIUM CHLORIDE, SODIUM LACTATE, POTASSIUM CHLORIDE, CALCIUM CHLORIDE AND DEXTROSE MONOHYDRATE: 5; 600; 310; 30; 20 INJECTION, SOLUTION INTRAVENOUS at 12:57

## 2023-03-16 RX ADMIN — HEPARIN SODIUM 5000 UNITS: 5000 INJECTION, SOLUTION INTRAVENOUS; SUBCUTANEOUS at 07:12

## 2023-03-16 RX ADMIN — ACETAMINOPHEN 650 MG: 325 TABLET ORAL at 21:30

## 2023-03-16 RX ADMIN — MIDAZOLAM 2 MG: 1 INJECTION INTRAMUSCULAR; INTRAVENOUS at 07:43

## 2023-03-16 RX ADMIN — Medication 20 MG: at 09:50

## 2023-03-16 RX ADMIN — MIDAZOLAM 1 MG: 1 INJECTION INTRAMUSCULAR; INTRAVENOUS at 06:58

## 2023-03-16 RX ADMIN — FENTANYL CITRATE 50 MCG: 50 INJECTION, SOLUTION INTRAMUSCULAR; INTRAVENOUS at 06:58

## 2023-03-16 RX ADMIN — PROTAMINE SULFATE 50 MG: 10 INJECTION, SOLUTION INTRAVENOUS at 10:48

## 2023-03-16 RX ADMIN — Medication 20 MG: at 09:23

## 2023-03-16 RX ADMIN — HEPARIN SODIUM 5000 UNITS: 1000 INJECTION INTRAVENOUS; SUBCUTANEOUS at 10:40

## 2023-03-16 RX ADMIN — ONDANSETRON 4 MG: 2 INJECTION INTRAMUSCULAR; INTRAVENOUS at 06:19

## 2023-03-16 RX ADMIN — CEFUROXIME 1.5 G: 1.5 INJECTION, POWDER, FOR SOLUTION INTRAVENOUS at 08:09

## 2023-03-16 RX ADMIN — FAMOTIDINE 20 MG: 20 TABLET, FILM COATED ORAL at 19:50

## 2023-03-16 RX ADMIN — FENTANYL CITRATE 100 MCG: 50 INJECTION, SOLUTION INTRAMUSCULAR; INTRAVENOUS at 07:54

## 2023-03-16 RX ADMIN — ACETAMINOPHEN 975 MG: 325 TABLET ORAL at 05:56

## 2023-03-16 RX ADMIN — KETOROLAC TROMETHAMINE 10 MG: 30 INJECTION, SOLUTION INTRAMUSCULAR at 11:45

## 2023-03-16 RX ADMIN — Medication 20 MG: at 08:59

## 2023-03-16 RX ADMIN — DEXAMETHASONE SODIUM PHOSPHATE 8 MG: 4 INJECTION, SOLUTION INTRA-ARTICULAR; INTRALESIONAL; INTRAMUSCULAR; INTRAVENOUS; SOFT TISSUE at 07:58

## 2023-03-16 RX ADMIN — HYDROMORPHONE HYDROCHLORIDE 0.5 MG: 1 INJECTION, SOLUTION INTRAMUSCULAR; INTRAVENOUS; SUBCUTANEOUS at 09:37

## 2023-03-16 RX ADMIN — BUPIVACAINE HYDROCHLORIDE 25 ML: 2.5 INJECTION, SOLUTION EPIDURAL; INFILTRATION; INTRACAUDAL; PERINEURAL at 07:05

## 2023-03-16 RX ADMIN — LIDOCAINE HYDROCHLORIDE 100 MG: 20 INJECTION, SOLUTION INFILTRATION; PERINEURAL at 07:54

## 2023-03-16 RX ADMIN — OXYCODONE HYDROCHLORIDE 5 MG: 5 TABLET ORAL at 19:51

## 2023-03-16 RX ADMIN — KETOROLAC TROMETHAMINE 10 MG: 15 INJECTION, SOLUTION INTRAMUSCULAR; INTRAVENOUS at 19:53

## 2023-03-16 RX ADMIN — SENNOSIDES AND DOCUSATE SODIUM 1 TABLET: 8.6; 5 TABLET ORAL at 19:52

## 2023-03-16 RX ADMIN — MAGNESIUM SULFATE HEPTAHYDRATE 2 G: 40 INJECTION, SOLUTION INTRAVENOUS at 08:12

## 2023-03-16 RX ADMIN — FUROSEMIDE 10 MG: 10 INJECTION, SOLUTION INTRAVENOUS at 09:54

## 2023-03-16 RX ADMIN — BUPIVACAINE 20 ML: 13.3 INJECTION, SUSPENSION, LIPOSOMAL INFILTRATION at 07:05

## 2023-03-16 RX ADMIN — SODIUM CHLORIDE, POTASSIUM CHLORIDE, SODIUM LACTATE AND CALCIUM CHLORIDE: 600; 310; 30; 20 INJECTION, SOLUTION INTRAVENOUS at 09:25

## 2023-03-16 RX ADMIN — SUGAMMADEX 200 MG: 100 INJECTION, SOLUTION INTRAVENOUS at 11:59

## 2023-03-16 RX ADMIN — PROCHLORPERAZINE EDISYLATE 10 MG: 5 INJECTION INTRAMUSCULAR; INTRAVENOUS at 14:18

## 2023-03-16 RX ADMIN — ONDANSETRON 8 MG: 2 INJECTION INTRAMUSCULAR; INTRAVENOUS at 11:32

## 2023-03-16 RX ADMIN — Medication 50 MG: at 07:54

## 2023-03-16 RX ADMIN — HYDROMORPHONE HYDROCHLORIDE 0.5 MG: 1 INJECTION, SOLUTION INTRAMUSCULAR; INTRAVENOUS; SUBCUTANEOUS at 10:48

## 2023-03-16 RX ADMIN — SODIUM CHLORIDE, POTASSIUM CHLORIDE, SODIUM LACTATE AND CALCIUM CHLORIDE: 600; 310; 30; 20 INJECTION, SOLUTION INTRAVENOUS at 08:00

## 2023-03-16 RX ADMIN — Medication 20 MG: at 10:34

## 2023-03-16 RX ADMIN — PROPOFOL 200 MG: 10 INJECTION, EMULSION INTRAVENOUS at 07:54

## 2023-03-16 RX ADMIN — MANNITOL 12.5 G: 12.5 INJECTION, SOLUTION INTRAVENOUS at 09:54

## 2023-03-16 ASSESSMENT — ACTIVITIES OF DAILY LIVING (ADL)
ADLS_ACUITY_SCORE: 18

## 2023-03-16 NOTE — PHARMACY-CONSULT NOTE
D/I: 23 year old male s/p kidney donation surgery on 3/16/2023.  Medications have been reviewed by the pharmacist for efficacy, appropriate dose, medication interactions and potential adverse effects.      A: Medications reviewed for this patient as above. No medication issues were noted.  P: Pharmacy will continue to monitor for any potential medication issues, and will make recommendations as appropriate. Medication therapy needs for discharge planning will continue to be addressed throughout the current admission via multidisciplinary rounds and order review.    Eloise Cherry, Pharm.D., BCPS, [facility]  Pager [phone]

## 2023-03-16 NOTE — ANESTHESIA POSTPROCEDURE EVALUATION
Patient: Griffin Flores    Procedure: Procedure(s):  Laparoscopic Hand Assisted Living Related Left Kidney Donor       Anesthesia Type:  General    Note:  Disposition: Admission   Postop Pain Control: Uneventful            Sign Out: Well controlled pain   PONV: No   Neuro/Psych: Uneventful            Sign Out: Acceptable/Baseline neuro status   Airway/Respiratory: Uneventful            Sign Out: Acceptable/Baseline resp. status   CV/Hemodynamics: Uneventful            Sign Out: Acceptable CV status; No obvious hypovolemia; No obvious fluid overload   Other NRE: NONE   DID A NON-ROUTINE EVENT OCCUR? No           Last vitals:  Vitals Value Taken Time   /61 03/16/23 1250   Temp 37.5  C (99.5  F) 03/16/23 1216   Pulse 68 03/16/23 1256   Resp 16 03/16/23 1245   SpO2 99 % 03/16/23 1256   Vitals shown include unvalidated device data.    Electronically Signed By: Han David MD  March 16, 2023  12:57 PM

## 2023-03-16 NOTE — ANESTHESIA PROCEDURE NOTES
TAP Procedure Note    Pre-Procedure   Staff -        Anesthesiologist:  Marycarmen Aquino MD       Resident/Fellow: Daniel De La Fuente MD       Performed By: resident and with residents       Procedure performed by resident/fellow/CRNA in presence of a teaching physician.         Location: pre-op       Procedure Start/Stop Times: 3/16/2023 6:50 AM and 3/16/2023 7:05 AM       Pre-Anesthestic Checklist: patient identified, IV checked, site marked, risks and benefits discussed, informed consent, monitors and equipment checked, pre-op evaluation, at physician/surgeon's request and post-op pain management  Timeout:       Correct Patient: Yes        Correct Procedure: Yes        Correct Site: Yes        Correct Position: Yes        Correct Laterality: Yes        Site Marked: Yes  Procedure Documentation  Procedure: TAP       Diagnosis: POST OPERATIVE PAIN       Laterality: bilateral       Patient Position: supine       Patient Prep/Sterile Barriers: sterile gloves, mask       Skin prep: Chloraprep       Needle Type: short bevel       Needle Gauge: 21.        Needle Length (millimeters): 110        Ultrasound guided       1. Ultrasound was used to identify targeted nerve, plexus, vascular marker, or fascial plane and place a needle adjacent to it in real-time.       2. Ultrasound was used to visualize the spread of anesthetic in close proximity to the above referenced structure.       3. A permanent image is entered into the patient's record.    Assessment/Narrative         The placement was negative for: blood aspirated, painful injection and site bleeding       Paresthesias: No.       Bolus given via needle..        Secured via.        Insertion/Infusion Method: Single Shot       Complications: none       Injection made incrementally with aspirations every 5 mL.    Medication(s) Administered   Bupivacaine 0.25% PF (Infiltration) - Infiltration   25 mL - 3/16/2023 7:05:00 AM  Bupivacaine liposome (Exparel) 1.3% LA  "inj susp (Infiltration) - Infiltration   20 mL - 3/16/2023 7:05:00 AM  Medication Administration Time: 3/16/2023 6:50 AM     Comments:  Discussed risks of nerve block, including nerve injury, bleeding, infection. Discussed anticipated incomplete analgesia. Discussed alternative of not performing a nerve block. Ensured understanding, invited questions and all questions were answered. Patient wishes to proceed.    Informed consent was obtained.   Patient tolerated well. Incremental aspiration every 5 mL. No paresthesia, no heme. Needle tip visualized throughout with appropriate spread of local anesthetic in fascial planes bilaterally.  Block was placed at the surgeon's request for post operative pain control.          FOR [facility] ONLY:   Pain Team Contact information: please page the Pain Team Via [employer]. Search \"Pain\". During daytime hours, please page the attending first. At night please page the resident first.    "

## 2023-03-16 NOTE — ANESTHESIA CARE TRANSFER NOTE
Patient: Anoulith V Sandra    Procedure: Procedure(s):  Laparoscopic Hand Assisted Living Related Left Kidney Donor       Diagnosis: Encounter for donation of kidney [Z52.4]  Diagnosis Additional Information: No value filed.    Anesthesia Type:   General     Note:    Oropharynx: oropharynx clear of all foreign objects and spontaneously breathing  Level of Consciousness: awake and drowsy  Oxygen Supplementation: face mask    Independent Airway: airway patency satisfactory and stable  Dentition: dentition unchanged  Vital Signs Stable: post-procedure vital signs reviewed and stable  Report to RN Given: handoff report given  Patient transferred to: PACU    Handoff Report: Identifed the Patient, Identified the Reponsible Provider, Reviewed the pertinent medical history, Discussed the surgical course, Reviewed Intra-OP anesthesia mangement and issues during anesthesia, Set expectations for post-procedure period and Allowed opportunity for questions and acknowledgement of understanding      Vitals:  Vitals Value Taken Time   /53 03/16/23 1216   Temp 37    Pulse 72 03/16/23 1220   Resp 16    SpO2 100 % 03/16/23 1220   Vitals shown include unvalidated device data.    Electronically Signed By: Charles Patrick Schlatter, APRN CRNA  March 16, 2023  12:21 PM

## 2023-03-16 NOTE — PHARMACY-ADMISSION MEDICATION HISTORY
Patient reports taking no prescription medications, over-the-counter medications, herbal, or supplements prior to admission.    Billy Solorzano, PharmD

## 2023-03-16 NOTE — ANESTHESIA PROCEDURE NOTES
Airway       Patient location during procedure: OR       Procedure Start/Stop Times: 3/16/2023 7:58 AM  Staff -        CRNA: Schlatter, Charles Patrick, APRN CRNA       Performed By: CRNA  Consent for Airway        Urgency: elective  Indications and Patient Condition       Indications for airway management: soila-procedural       Induction type:intravenous       Mask difficulty assessment: 1 - vent by mask    Final Airway Details       Final airway type: endotracheal airway       Successful airway: ETT - single  Endotracheal Airway Details        ETT size (mm): 7.5       Cuffed: yes       Successful intubation technique: direct laryngoscopy       DL Blade Type: MAC 3       Grade View of Cords: 3       Adjucts: stylet       Position: Right       Measured from: gums/teeth       Secured at (cm): 24       Bite block used: None    Post intubation assessment        Placement verified by: capnometry, equal breath sounds and chest rise        Number of attempts at approach: 1       Number of other approaches attempted: 0       Secured with: pink tape       Ease of procedure: easy       Dentition: Intact    Medication(s) Administered   Medication Administration Time: 3/16/2023 7:58 AM

## 2023-03-16 NOTE — INTERVAL H&P NOTE
"I have reviewed the surgical (or preoperative) H&P that is linked to this encounter, and examined the patient. There are no significant changes    Clinical Conditions Present on Arrival:  Clinically Significant Risk Factors Present on Admission                    # Overweight: Estimated body mass index is 28.32 kg/m  as calculated from the following:    Height as of 3/7/23: 1.817 m (5' 11.54\").    Weight as of 3/7/23: 93.5 kg (206 lb 1.6 oz).       "

## 2023-03-16 NOTE — PROGRESS NOTES
Transplant Surgery Post-Op Check    S: Denies chest pain, dizziness, SOB, N/V. Minimal abdominal pain. Tolerating clears. Has not passed gas.    O:   Vitals: /63 (BP Location: Right arm)   Pulse 70   Temp 98.8  F (37.1  C) (Oral)   Resp 26   Ht 1.829 m (6')   Wt 95.1 kg (209 lb 10.5 oz)   SpO2 99%   BMI 28.43 kg/m      Gen: resting in bed, in no apparent distress  Skin: warm, dry   CV: Well perfused  Resp: Unlabored  GI: abdomen soft, non-distended. Incision and lap sites covered; C/D/I.   : Whittaker in place with clear yellow UOP  Ext: no edema noted  Neuro: Lethargic, oriented    A/P: Anoulith V Sandra is a healthy 23 year old male who is s/p Left donor nephrectomy on 3/16/23 with Dr. Astorga.    -PRN and scheduled analgesics  -PRN antiemetics  -D5LR 60 mL/hr  -Regular diet  -Monitor I&O  -IS/CDB Q1H  -OOB this evening  -DVT ppx (subcutaneous heparin)    Chantel Bennett NP   Transplant Surgery #[id number]

## 2023-03-16 NOTE — BRIEF OP NOTE
[facility]    Brief Operative Note    Pre-operative diagnosis: Encounter for donation of kidney [Z52.4]  Post-operative diagnosis Same as pre-operative diagnosis    Procedure: Procedure(s):  Laparoscopic Hand Assisted Living Related Left Kidney Donor  Surgeon: Surgeon(s) and Role:     * Beatriz Astorga MD - Primary     * Mary Jo Mcdaniels MD - Resident - Assisting  Anesthesia: General with Block   Estimated Blood Loss: minimal    Drains: None  Specimens: * No specimens in log *  Findings:   Left kidney with single renal artery, vein and ureter  Complications: None.  Implants: * No implants in log *

## 2023-03-17 VITALS
BODY MASS INDEX: 28.4 KG/M2 | WEIGHT: 209.66 LBS | HEIGHT: 72 IN | RESPIRATION RATE: 16 BRPM | HEART RATE: 58 BPM | SYSTOLIC BLOOD PRESSURE: 125 MMHG | TEMPERATURE: 98.3 F | DIASTOLIC BLOOD PRESSURE: 76 MMHG | OXYGEN SATURATION: 100 %

## 2023-03-17 PROBLEM — G89.18 ACUTE POST-OPERATIVE PAIN: Status: ACTIVE | Noted: 2023-03-17

## 2023-03-17 LAB
ALBUMIN UR-MCNC: NEGATIVE MG/DL
APPEARANCE UR: CLEAR
BILIRUB UR QL STRIP: NEGATIVE
BUN SERPL-MCNC: 18.7 MG/DL (ref 6–20)
CK SERPL-CCNC: 295 U/L (ref 39–308)
COLOR UR AUTO: ABNORMAL
CREAT SERPL-MCNC: 1.78 MG/DL (ref 0.67–1.17)
GFR SERPL CREATININE-BSD FRML MDRD: 55 ML/MIN/1.73M2
GLUCOSE UR STRIP-MCNC: NEGATIVE MG/DL
HCT VFR BLD AUTO: 41 % (ref 40–53)
HGB BLD-MCNC: 12.4 G/DL (ref 13.3–17.7)
HGB UR QL STRIP: NEGATIVE
KETONES UR STRIP-MCNC: NEGATIVE MG/DL
LEUKOCYTE ESTERASE UR QL STRIP: NEGATIVE
MUCOUS THREADS #/AREA URNS LPF: PRESENT /LPF
NITRATE UR QL: NEGATIVE
PH UR STRIP: 6 [PH] (ref 5–7)
RBC URINE: <1 /HPF
SP GR UR STRIP: 1.02 (ref 1–1.03)
UROBILINOGEN UR STRIP-MCNC: NORMAL MG/DL
WBC URINE: 2 /HPF

## 2023-03-17 PROCEDURE — 82565 ASSAY OF CREATININE: CPT

## 2023-03-17 PROCEDURE — 250N000011 HC RX IP 250 OP 636

## 2023-03-17 PROCEDURE — 84520 ASSAY OF UREA NITROGEN: CPT

## 2023-03-17 PROCEDURE — 250N000013 HC RX MED GY IP 250 OP 250 PS 637

## 2023-03-17 PROCEDURE — 81001 URINALYSIS AUTO W/SCOPE: CPT

## 2023-03-17 PROCEDURE — 99024 POSTOP FOLLOW-UP VISIT: CPT | Performed by: SURGERY

## 2023-03-17 PROCEDURE — 85014 HEMATOCRIT: CPT

## 2023-03-17 PROCEDURE — 82550 ASSAY OF CK (CPK): CPT

## 2023-03-17 PROCEDURE — 36415 COLL VENOUS BLD VENIPUNCTURE: CPT

## 2023-03-17 RX ORDER — AMOXICILLIN 250 MG
1 CAPSULE ORAL 2 TIMES DAILY
Qty: 60 TABLET | Refills: 0 | Status: SHIPPED | OUTPATIENT
Start: 2023-03-17 | End: 2023-03-28

## 2023-03-17 RX ORDER — METHOCARBAMOL 500 MG/1
500 TABLET, FILM COATED ORAL 4 TIMES DAILY PRN
Qty: 20 TABLET | Refills: 0 | Status: SHIPPED | OUTPATIENT
Start: 2023-03-17 | End: 2023-03-28

## 2023-03-17 RX ORDER — ACETAMINOPHEN 325 MG/1
650 TABLET ORAL EVERY 6 HOURS PRN
Qty: 60 TABLET | Refills: 0 | Status: SHIPPED | OUTPATIENT
Start: 2023-03-17 | End: 2023-03-28

## 2023-03-17 RX ORDER — OXYCODONE HYDROCHLORIDE 5 MG/1
5-10 TABLET ORAL EVERY 4 HOURS PRN
Qty: 20 TABLET | Refills: 0 | Status: SHIPPED | OUTPATIENT
Start: 2023-03-17 | End: 2023-03-28

## 2023-03-17 RX ADMIN — SENNOSIDES AND DOCUSATE SODIUM 2 TABLET: 50; 8.6 TABLET ORAL at 07:51

## 2023-03-17 RX ADMIN — ACETAMINOPHEN 650 MG: 325 TABLET ORAL at 02:53

## 2023-03-17 RX ADMIN — BISACODYL 10 MG: 10 SUPPOSITORY RECTAL at 07:55

## 2023-03-17 RX ADMIN — HEPARIN SODIUM 5000 UNITS: 5000 INJECTION, SOLUTION INTRAVENOUS; SUBCUTANEOUS at 07:51

## 2023-03-17 RX ADMIN — KETOROLAC TROMETHAMINE 10 MG: 15 INJECTION, SOLUTION INTRAMUSCULAR; INTRAVENOUS at 11:44

## 2023-03-17 RX ADMIN — KETOROLAC TROMETHAMINE 10 MG: 15 INJECTION, SOLUTION INTRAMUSCULAR; INTRAVENOUS at 04:44

## 2023-03-17 RX ADMIN — ACETAMINOPHEN 650 MG: 325 TABLET ORAL at 09:47

## 2023-03-17 ASSESSMENT — ACTIVITIES OF DAILY LIVING (ADL)
ADLS_ACUITY_SCORE: 20
ADLS_ACUITY_SCORE: 18
ADLS_ACUITY_SCORE: 20
ADLS_ACUITY_SCORE: 20
ADLS_ACUITY_SCORE: 18

## 2023-03-17 NOTE — DISCHARGE SUMMARY
[facility]    Discharge Summary  Transplant Surgery    Date of Admission:  3/16/2023  Date of Discharge:  3/17/2023  Discharging Provider: Chantel Bennett NP / Beatriz Astorga MD    Discharge Diagnoses   Principal Problem:    Encounter for donation of kidney  Active Problems:    Acute post-operative pain    Procedure/Surgery Information   Procedure: Procedure(s):  Laparoscopic Hand Assisted Living Related Left Kidney Donor   Surgeon(s): Surgeon(s) and Role:     * Beatriz Astorga MD - Primary     * Mary Jo Mcdaniels MD - Resident - Assisting       Non-operative procedures 3/16/23 TAP Block     History of Present Illness   Griffin Flores is a healthy 23 year old male who is s/p Left donor nephrectomy on 3/16/23 with Dr. Astorga.    Hospital Course   Griffin Flores was admitted on 3/16/2023.  The following problems were addressed during his hospitalization:    S/p Left living donor nephrectomy 3/16/23: POD#1 and recovering as expected post operatively. Pain is well controlled. Denies N/V. Tolerating regular diet. Voiding spontaneously. BM x1 and passing gas. Up ad javier in room and hillman. Plan for follow-up with Dr. Astorga in 2 weeks.     Acute post op pain: Well controlled on current regimen of PRN Tylenol, Robaxin, and oxycodone. Continue bowel regimen with opioids and recent surgery.    Transplant coordinator: Nelly Madrid [phone]    Discharge Disposition   Discharged to home   Condition at discharge: Stable    Primary Care Physician   WENDY OROZCO    Physical Exam   Temp: 98.3  F (36.8  C) Temp src: Oral BP: 125/76 Pulse: 58   Resp: 16 SpO2: 100 % O2 Device: None (Room air) Oxygen Delivery: 1 LPM  Vitals:    03/16/23 0531   Weight: 95.1 kg (209 lb 10.5 oz)     Vital Signs with Ranges  Temp:  [97.8  F (36.6  C)-99.5  F (37.5  C)] 98.3  F (36.8  C)  Pulse:  [50-86] 58  Resp:  [11-26] 16  BP: (105-126)/(47-76) 125/76  SpO2:  [97 %-100 %]  100 %  I/O last 3 completed shifts:  In: 4708 [P.O.:700; I.V.:4008]  Out: 2790 [Urine:2790]    Constitutional: resting comfortably in bed  Eyes: EOMI  Respiratory: unlabored on RA  Cardiovascular: RRR  GI: abdomen soft, non-distended. Incision and lap sites covered; C/D/I.   Genitourinary: no Whittaker present  Skin: warm, dry  Musculoskeletal: no edema noted  Neurologic: A&Ox4  Neuropsychiatric: behavior appropriate to situation; pleasant    Consultations This Hospital Stay   PHARMACY IP CONSULT  SOT MEDICATION HISTORY IP PHARMACY CONSULT    Time Spent on this Encounter   I have spent greater than 30 minutes on this discharge.    Discharge Orders   Discharge Medications   Current Discharge Medication List      START taking these medications    Details   acetaminophen (TYLENOL) 325 MG tablet Take 2 tablets (650 mg) by mouth every 6 hours as needed for pain  Qty: 60 tablet, Refills: 0    Associated Diagnoses: Acute post-operative pain      methocarbamol (ROBAXIN) 500 MG tablet Take 1 tablet (500 mg) by mouth 4 times daily as needed for muscle spasms  Qty: 20 tablet, Refills: 0    Associated Diagnoses: Acute post-operative pain      oxyCODONE (ROXICODONE) 5 MG tablet Take 1-2 tablets (5-10 mg) by mouth every 4 hours as needed for moderate to severe pain  Qty: 20 tablet, Refills: 0    Associated Diagnoses: Acute post-operative pain      senna-docusate (SENOKOT-S/PERICOLACE) 8.6-50 MG tablet Take 1 tablet by mouth 2 times daily  Qty: 60 tablet, Refills: 0    Associated Diagnoses: Encounter for donation of kidney                Reason for your hospital stay    You were admitted for living donor nephrectomy. You are discharging home in stable condition with close follow up.     Activity    Your activity upon discharge: Don't lift anything heavier than 10 pounds for 8 weeks (or longer if your surgeon has discussed this with you).  Walk regularly.    OK to drive only after no longer taking narcotics AND you feel comfortable  "working the breaks/clutch suddenly if needed.  Limit sports and strenuous activities/'core' exercises for 8 weeks.  If you experience pain after exertion, try an ice pack or warm pack to the area.   Wear abdominal binder for comfort if you desire.    You have been instructed to avoid NSAIDs (medications such as ibuprofen, \"Motrin\", naproxen, diclofenac) as these medications may affect the remaining kidney. Take other medications as prescribed.    Keep narcotics out of reach of children. When finished with them, please destroy/discard any remaining pills responsibly. Often, your [facility] government office, local police station or pharmacy will have a drug deposit box.     Adult [facility] Follow-up and recommended labs and tests    Follow up with Dr. Astorga in Transplant Clinic in 2-3 weeks.  If you need to change your appointment please contact your coordinator at [phone].     When to contact your care team    Your transplant coordinator if you have any of the following:   Swelling, oozing, worsening pain, unusual redness around the incision  Fever of 101 F or higher   Increasing abdominal pain   Nausea or vomiting   Severe diarrhea, bloating, or constipation     Any concerns or questions, please call your Transplant coordinator:    Phone: [phone].  If they are not available, the on call coordinator/MD will help you with your concern.  If you are unable to reach a coordinator and have an urgent medical questions, please call the hospital at [phone] and ask to have the Pancreas Transplant Surgery fellow on-call paged.     Wound care and dressings    Instructions to care for your wound at home: If your incisions have GLUE, gently wash with soap and water, but do not scrub. Do not soak in a bath until the glue has naturally fallen off and any openings are closed (at least 2 weeks).    If your incisions have STERI STRIPS, leave steri strips in place until they curl and peel off. Please don't shower " until 48 hours after surgery. Then gently wash with soap and water, but do not scrub them. Do not soak in a bath until the strips have naturally fallen off and any openings are closed (at least 2 weeks).     Diet    Follow this diet upon discharge: Keep yourself well hydrated (goal 1500 ml/day).   Eat lightly the first week as tolerated and avoid constipating foods.  If you are constipated, take a stool softener like Senna, Dulcolax, Miralax, or a Fleets enema (available over the counter).         Data   Most Recent 3 CBC's:  Recent Labs   Lab Test 03/17/23  0542 03/16/23  1247 03/07/23  0925 05/27/22  1123 01/21/22  0810   WBC  --  13.9*  --  4.8 5.1   HGB 12.4* 13.1* 14.9 14.2 14.6   MCV  --  68*  --  68* 68*   PLT  --  156  --  194 203      Most Recent 3 BMP's:  Recent Labs   Lab Test 03/17/23  0542 03/16/23  0646 03/07/23  0925 05/19/22  0620 01/21/22  0810   NA  --   --   --   --  142   POTASSIUM  --   --   --   --  4.0   CHLORIDE  --   --   --   --  106   CO2  --   --   --   --  30   BUN 18.7  --   --   --  12   CR 1.78*  --  1.11 1.13 1.03*   ANIONGAP  --   --   --   --  6   ROSALINA  --   --   --   --  9.4   GLC  --  105*  --  102* 90     Most Recent 2 LFT's:  Recent Labs   Lab Test 01/21/22  0810   AST 14   ALT 44   ALKPHOS 36*   BILITOTAL 0.3     Most Recent INR's and Anticoagulation Dosing History:  Anticoagulation Dose History     Recent Dosing and Labs Latest Ref Rng & Units 1/21/2022    INR 0.85 - 1.15 1.02        Most Recent 3 Troponin's:No lab results found.  Most Recent Cholesterol Panel:  Recent Labs   Lab Test 05/19/22  0620   CHOL 158   LDL 83   HDL 55   TRIG 98     Most Recent 6 Bacteria Isolates From Any Culture (See EPIC Reports for Culture Details):No lab results found.  Most Recent TSH, T4 and A1c Labs:  Recent Labs   Lab Test 05/19/22  0620   A1C 5.6     Results for orders placed or performed in visit on 03/15/23   XR Chest 2 Views    Narrative    Chest 2 views    INDICATION: Encounter for  kidney donation    COMPARISON: 1/21/2022    FINDINGS: Heart size and shape appear normal. Lungs and pulmonary  vasculature appear normal. Bony structures appear grossly intact.      Impression    IMPRESSION: Negative    LESLEY LANG MD         SYSTEM ID:  [id number]

## 2023-03-17 NOTE — CONSULTS
LIVING DONOR SOCIAL WORK NOTE:  D:  Armando is a living kidney donor, POD one.  I:  I met with Armando at bedside to thank him for his donation and to assess for any psychosocial needs and to assist with discharge planning.  I assessed the patient's mood/affect, plans for recovery, and any feelings of regret or remorse regarding donation.   A:  He indicated he was able to rest andup walking on the unit/in their bed.  Armando appears to be in a good mood and affect is congruent.  Armando states that pain is well controlled.  He reports that his father the  recipient is doing well so far, and he has been able to see the recipient.  Patient describes donation recovery as good so far.  Armando and I discussed how to reach me should if he have any post operative psychosocial needs.  Armando reports no feelings of regret or remorse about donation.  He denies any discharge planning needs at this time.  Patient plans to discharge to home with the care and support of his family.   P: Donor  will remain available to assist with any psychosocial needs, both inpatient and outpatient, as needed.  Patient is aware of follow-up recommendations and has my contact information.      MARIE Boyer, [facility]  Transplant

## 2023-03-17 NOTE — PROGRESS NOTES
/60 (BP Location: Right arm)   Pulse 57   Temp 98.3  F (36.8  C) (Oral)   Resp 20   Ht 1.829 m (6')   Wt 95.1 kg (209 lb 10.5 oz)   SpO2 99%   BMI 28.43 kg/m      Shift: 3314-7702  Isolation Status: none  VS: stable on 1L, afebrile  Neuro: Aox4, lethargic   Behaviors: calm and cooperative  BG: none  Labs: reviewed  Respiratory: WDL, no shortness of breath reported  Cardiac: WDL, no chest pain reported   Pain/Nausea: 8/10 incisional pain reported, nausea when standing at edge of bed  PRN: PRN 5mg oxy given for pain  Diet: regular, good appetite  IV Access: Right PIV, left PIV saline locked  Infusion(s): D5LR going at 60 ml/hr  Lines/Drains: morton with clear, yellow output  GI/: No bm, passing gas  Skin: incision and lap sites covered, clean, dry and intact  Mobility: Standby assist  Events/Education: Sat pt at edge of bed and got him standing. He became very nauseated and had to sit back down.    Plan: Continue POC

## 2023-03-17 NOTE — PLAN OF CARE
2300 - 0730    /47 (BP Location: Right arm)   Pulse 50   Temp 98  F (36.7  C) (Oral)   Resp 11   Ht 1.829 m (6')   Wt 95.1 kg (209 lb 10.5 oz)   SpO2 97%   BMI 28.43 kg/m      VS: stable on RA - off of CAPNO, tele - sinus nell  BG: none  Labs: UA sent, AM labs pending  Pain/Nausea/PRN: scheduled tylenol and toradol  Diet: Regular   LDA: PIV x 2-SL  GI: active bowl sounds, passing gas  : morton removed @ 0620 - pt educated about measuring urine and needing PVRs - pt understood  Skin: midline and lap sites covered, CDI  Mobility: Not OOB last night, slept well  Plan: Due to void, continue with POC, update team with any changes

## 2023-03-17 NOTE — PHARMACY-TRANSPLANT NOTE
Solid Organ Transplant Donor Prior to Discharge Note  23 year old male s/p kidney donation surgery on 3/16/23.     Pharmacy has monitored for any potential medication issues.  In anticipation for discharge, medication therapy needs have been addressed daily throughout the current admission via multidisciplinary rounds and/or discussions, order verification, daily clinical pharmacy review, and communication with prescribers.    Singh GaleanoD, BCTXP, [facility]  Inpatient clinical pharmacist

## 2023-03-17 NOTE — PROGRESS NOTES
Admitted/transferred from:   Time of arrival on unit 1500  2 RN skin assessment completed by Deja Porter and Bridgett BALTAZAR  Skin assessment finding: WNL  Interventions/actions: None    Will continue to monitor.

## 2023-03-17 NOTE — PLAN OF CARE
DISCHARGE:  Patient with orders to discharge to home.     Education Provided:   Handouts - AVS printed and given to patient  Specialty Pharmacy - Rx filled and to be picked up by patient    Discharge instructions, medications & follow ups reviewed with patient. Copy of discharge summary given to patient. PIV x 2 removed.    Patient in stable condition. AVSS. Patient had no further questions regarding discharge instructions and medications. Patient transferred out by self & left with family.    Plan for follow up as directed in AVS handout.

## 2023-03-17 NOTE — PROGRESS NOTES
INDEPENDENT LIVING DONOR ADVOCATE NOTE:  D:  Armando is a living kindey donor, POD 1.  I:  I met with him at bedside to thank him for kidney donation and to assess for any ROSEMARY needs.  I assessed the patient's mood/affect, plans for recovery, and any feelings of regret/remorse regarding donation.  We reviewed the importance of completing follow-up labs and surveys at six months, 1 year and 2 years after donation to monitor kidney health and the impact donation has had on their life post donation.   A:  He is resting comfortably in bed on the unit.  He appears to be in a good mood and affect is congruent.  He states that pain is well controlled.  He reports that his father/ recipient is doing well.  He was able to donate directly to his father.  Patient describes donation recovery as going well .  He and I discussed how to reach me should he have any post operative ROSEMARY needs.  He reports no feelings of regret or remorse about donation.  He denies any discharge planning needs at this time.  Patient plans to discharge to home with the care and support of his sister/ family.   P: ROSEMARY will remain available to assist with any support and ROSEMARY needs, both inpatient and outpatient, as needed.  Patient is aware of follow-up recommendations and has my contact information.

## 2023-03-19 ENCOUNTER — PATIENT OUTREACH (OUTPATIENT)
Dept: CARE COORDINATION | Facility: CLINIC | Age: 23
End: 2023-03-19

## 2023-03-19 NOTE — PROGRESS NOTES
Connected Care Resource Center Contact  [facility]/Voicemail     Clinical Data: Transitional Care Management Outreach     Outreach attempted x 2.  Left message on patient's voicemail, providing [facility]'s 24/7 scheduling and nurse triage phone number [phone] ([phone]) for questions/concerns and/or to schedule an appt with an [facility] provider, if they do not have a PCP.      Plan:  [facility] will do no further outreaches at this time.       Bev Swain MA  Connected Care Resource Center, [facility]    *Connected Care Resource Team does NOT follow patient ongoing. Referrals are identified based on internal discharge reports and the outreach is to ensure patient has an understanding of their discharge instructions.

## 2023-03-20 ENCOUNTER — TELEPHONE (OUTPATIENT)
Dept: TRANSPLANT | Facility: CLINIC | Age: 23
End: 2023-03-20

## 2023-03-20 NOTE — TELEPHONE ENCOUNTER
Post-hospital outreach call made to check in post kidney donation.     Armando reports overall feeling well.    Incision: looks and feels okay  Pain: tolerable-still taking Tylenol  Bowels: passing bowels   Appetite: no issues  Fluids: drinking without issues  Urinary: no issues  Activity: Armando is using abd binder when walking.     Reviewed: abdominal precautions, lifting restrictions.    Next appointment: 3/28/23 at 0930am    Reminded Armando about UNOS follow-up requirements.     Armando knows how to get in touch with me or an on call coordinator with any questions or concerns.

## 2023-03-20 NOTE — TELEPHONE ENCOUNTER
CONCHIS called Armando to answer questions about donor shield.     Shani Haines, [facility], CCTSW   Living Donor   [facility], [facility], [facility]  Direct: [phone]  E-Mail: [email or web address]

## 2023-03-21 NOTE — OP NOTE
Transplant Surgery  Operative Note     Procedure Date:  03/21/23    Preoperative Diagnosis:  Healthy kidney donor    Postoperative Diagnosis:  Healthy kidney donor    Procedure:  1. Left Kidney laparoscopic living donor nephrectomy for donation       Secondary Procedure:    None    Surgeon:    Surgeon(s) and Role:     * Beatriz Astorga MD - Primary     * Mary Jo Mcdaniels MD - Resident - Assisting    Fellow/Assistant:    No fellow available    Specimen:  Left kidney and ureter    Anesthesia:    General    Urine Output: see anesthesia record  Estimated Blood Loss: 50 mls   Fluids administered: see anesthesia record     Intra Op Events: none     Complications: None.    Findings: no unusual findings.       None.        Donor UNOS ID:    [id number]  Flush Start time:  3/16/2023 10:50 AM    Arterial Clamp:    3/16/2023 10:47 AM  Arterial anatomy:  2    Venous anatomy:    1  Ureteral anatomy:   1     Indication: Griffin Flores presents for Left Kidney donation. The patient has undergone a thorough medical and psychosocial evaluation and was found suitable for voluntary kidney donation. Risks and benefits of donation were discussed. The patient expressed understanding, was willing to proceed, and provided informed consent.    Final ABO/Crossmatch verification: Prior to incision, I verified the donor ABO and recipient ABO. I visually verified that the donor identification, blood type, and other vital data were compatible with the recipient.      Operative Procedure:  Griffin Flores was transported to the operating room, placed on the operating table in the right lateral decubitus position, and a universal timeout was performed. Sequential compression devices were placed on both lower extremities and general endotracheal anesthesia was induced. The patient was given IV antibiotics and Whittaker catheter.  The abdomen was shaved, prepped, and draped in the usual sterile fashion.    We made a 6 cm upper midline incision  and carried down thru the linea alba. The peritoneum was opened under direct vision. The hand port was put into position and a left lower quadrant 10 mm port was placed over a trocar with hand assistance. Pneumoperitoneum with CO2 was provided to 12 mmHg. General survey with the laparoscope revealed no unusual findings. An additional 10 mm port was placed in the left lateral abdomen under direct vision.     We released the left colon from its lateral attachments and rotated medially, revealing the kidney and ureter. The ureter was circumferentially dissected free distally toward the pelvis then kidney taking care to preserve its vasculature. The gonadal vein was identified and left in place, and the proximal gonadal vein was doubly ligated and divided near the insertion into the left renal vein. The left adrenal vein was likewise identified, ligated and divided. The renal vein was then circumferentially cleared of extraneous tissue. The kidney and ureter were dissected free posteriorly from the psoas and multiple lumbar veins were clipped and divided.     We created a plane between the left adrenal gland and the upper pole of the kidney with the harmonic scalpel and the upper pole attachments were released. The anterior and inferior aspects of the renal artery at its origin were cleared of investing lymphatics. The patient was given fluid, mannitol and lasix, and the kidney was then dissected free from its lateral attachments allowing full medial rotation. The remaining lymphatics and fat around the renal artery was cleared. The patient was heparinized and the distal ureter  was clipped and divided. Good urine flow was seen. A laparoscopic ELLA stapler was fired across the renal artery and then the renal vein.     The kidney was delivered from the hand port, flushed, and taken to recipient room. Protamine was administered for full heparin reversal. Pneumoperitoneum was reestablished and hemostasis was obtained.  Vascular transection sites were visualized and confirmed secure. The colon was placed back in its natural position. The 10 mm port sites were closed with 0-vicryl. The hand port was closed with 1 prolene. Skin incisions were irrigated and closed with 4-0 monocryl and steri strips. Needle, sponge, and instrument counts were correct x2.  Faculty was present for key portions of the procedure. The patient tolerated the procedure well without apparent complications and was extubated and transferred to PACU in good condition.   Physician Attestation   I was present for the entire procedure between opening and closing.    Beatriz Astorga MD, MD  Date of Service (when I saw the patient): 3/16/2023

## 2023-03-23 DIAGNOSIS — Z52.4 KIDNEY DONOR: Primary | ICD-10-CM

## 2023-03-28 ENCOUNTER — OFFICE VISIT (OUTPATIENT)
Dept: TRANSPLANT | Facility: CLINIC | Age: 23
End: 2023-03-28
Attending: SURGERY

## 2023-03-28 VITALS
BODY MASS INDEX: 27.97 KG/M2 | DIASTOLIC BLOOD PRESSURE: 86 MMHG | WEIGHT: 206.2 LBS | SYSTOLIC BLOOD PRESSURE: 131 MMHG | HEART RATE: 67 BPM | OXYGEN SATURATION: 98 %

## 2023-03-28 DIAGNOSIS — Z52.4 ENCOUNTER FOR DONATION OF KIDNEY: ICD-10-CM

## 2023-03-28 DIAGNOSIS — Z09 SURGICAL FOLLOW-UP CARE: Primary | ICD-10-CM

## 2023-03-28 PROCEDURE — G0463 HOSPITAL OUTPT CLINIC VISIT: HCPCS | Performed by: SURGERY

## 2023-03-28 PROCEDURE — 99024 POSTOP FOLLOW-UP VISIT: CPT | Performed by: SURGERY

## 2023-03-28 ASSESSMENT — PAIN SCALES - GENERAL: PAINLEVEL: NO PAIN (0)

## 2023-03-28 NOTE — LETTER
3/28/2023         RE: Griffin Flores  [address] [place] [id number]        Dear Colleague,    Thank you for referring your patient, Griffin Flores, to the [facility]. Please see a copy of my visit note below.    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    Assessment and Recommendations: Mr. Flores is doing well after kidney donation.     1. Lifting restrictions: 10 lbs until 8 weeks post donation.  2. Followup: 4 weeks as needed  3. Return to work to be determined per patient's progress, expected between 6-8 weeks after surgery.  4. Will have labs at 6 weeks, 6m, 1yr, and 2yr post donation  5. Discussed maintaining healthy lifestyle with diet, exercise, monitoring blood pressure, and keeping up with recommended health maintenance.  6. Encouraged to call us in the future if he has any questions about medications, his kidney function, his labs, or any concerns he may feel are related to donation.    Total time: 15 minutes  Counselling time: 10 minutes      Beatriz Astorga MD FACS  Assistant Professor of Surgery  Director, Living Kidney Donor Program.    ------------------------------------------------------------------------------------------    S: Mr. Flores donate a kidney 10 day ago and is doing well, reporting no fevers, chills, dysuria.  He is not taking narcotic analgesia.  He is not constipated.  He is mostly back to routine activities of daily living and is feeling ready to return to work at this time.    Medications:  Prescription Medications as of 3/28/2023       Rx Number Disp Refills Start End Last Dispensed Date Next Fill Date Owning Pharmacy    acetaminophen (TYLENOL) 325 MG tablet  60 tablet 0 3/17/2023    [facility] - [place], [place] - [address]    Sig: Take 2 tablets (650 mg) by mouth every 6  hours as needed for pain    Class: E-Prescribe    Route: Oral    methocarbamol (ROBAXIN) 500 MG tablet  20 tablet 0 3/17/2023    [facility] - [address]    Sig: Take 1 tablet (500 mg) by mouth 4 times daily as needed for muscle spasms    Class: E-Prescribe    Route: Oral    oxyCODONE (ROXICODONE) 5 MG tablet  20 tablet 0 3/17/2023    [facility] - [address]    Sig: Take 1-2 tablets (5-10 mg) by mouth every 4 hours as needed for moderate to severe pain    Class: E-Prescribe    Earliest Fill Date: 3/17/2023    Route: Oral    senna-docusate (SENOKOT-S/PERICOLACE) 8.6-50 MG tablet  60 tablet 0 3/17/2023    [facility] - [address]    Sig: Take 1 tablet by mouth 2 times daily    Class: E-Prescribe    Route: Oral          Exam:      Appearance: in no apparent distress.   Skin: normal  Head and Neck: Normal, no rashes or jaundice  Respiratory: normal respiratory excursions, no audible wheeze  Cardiovascular: RRR  Abdomen: flat, No distention and incisions C/D/I   Extremeties: Edema, none  Neuro: without deficit       Labs:   Admission on 03/16/2023, Discharged on 03/17/2023   Component Date Value Ref Range Status     Hold Specimen 03/16/2023 Specimen Received   Final     Hold Specimen 03/16/2023 JIC   Final     Hold Specimen 03/16/2023 JI   Final     GLUCOSE BY METER POCT 03/16/2023 105 (H)  70 - 99 mg/dL Final     WBC Count 03/16/2023 13.9 (H)  4.0 - 11.0 10e3/uL Final     RBC Count 03/16/2023 6.37 (H)  4.40 - 5.90 10e6/uL Final     Hemoglobin 03/16/2023 13.1 (L)  13.3 - 17.7 g/dL Final     Hematocrit 03/16/2023 43.4  40.0 - 53.0 % Final     MCV 03/16/2023 68 (L)  78 - 100 fL Final     MCH 03/16/2023 20.6 (L)  26.5 - 33.0 pg Final     MCHC 03/16/2023 30.2 (L)  31.5 - 36.5 g/dL Final     RDW 03/16/2023 15.1 (H)  10.0 - 15.0 % Final     Platelet Count 03/16/2023 156  150 - 450 10e3/uL  Final     CK 03/16/2023 105  39 - 308 U/L Final     Hemoglobin 03/17/2023 12.4 (L)  13.3 - 17.7 g/dL Final     Hematocrit 03/17/2023 41.0  40.0 - 53.0 % Final     Urea Nitrogen 03/17/2023 18.7  6.0 - 20.0 mg/dL Final     Creatinine 03/17/2023 1.78 (H)  0.67 - 1.17 mg/dL Final     GFR Estimate 03/17/2023 55 (L)  >60 mL/min/1.73m2 Final    eGFR calculated using 2021 CKD-EPI equation.     CK 03/17/2023 295  39 - 308 U/L Final     Color Urine 03/17/2023 Light Yellow  Colorless, Straw, Light Yellow, Yellow Final     Appearance Urine 03/17/2023 Clear  Clear Final     Glucose Urine 03/17/2023 Negative  Negative mg/dL Final     Bilirubin Urine 03/17/2023 Negative  Negative Final     Ketones Urine 03/17/2023 Negative  Negative mg/dL Final     Specific Gravity Urine 03/17/2023 1.016  1.003 - 1.035 Final     Blood Urine 03/17/2023 Negative  Negative Final     pH Urine 03/17/2023 6.0  5.0 - 7.0 Final     Protein Albumin Urine 03/17/2023 Negative  Negative mg/dL Final     Urobilinogen Urine 03/17/2023 Normal  Normal, 2.0 mg/dL Final     Nitrite Urine 03/17/2023 Negative  Negative Final     Leukocyte Esterase Urine 03/17/2023 Negative  Negative Final     Mucus Urine 03/17/2023 Present (A)  None Seen /LPF Final     RBC Urine 03/17/2023 <1  <=2 /HPF Final     WBC Urine 03/17/2023 2  <=5 /HPF Final       Again, thank you for allowing me to participate in the care of your patient.        Sincerely,        Beatriz Astorga MD, MD

## 2023-03-28 NOTE — PROGRESS NOTES
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    Assessment and Recommendations: Mr. Flores is doing well after kidney donation.     1. Lifting restrictions: 10 lbs until 8 weeks post donation.  2. Followup: 4 weeks as needed  3. Return to work to be determined per patient's progress, expected between 6-8 weeks after surgery.  4. Will have labs at 6 weeks, 6m, 1yr, and 2yr post donation  5. Discussed maintaining healthy lifestyle with diet, exercise, monitoring blood pressure, and keeping up with recommended health maintenance.  6. Encouraged to call us in the future if he has any questions about medications, his kidney function, his labs, or any concerns he may feel are related to donation.    Total time: 15 minutes  Counselling time: 10 minutes      Beatriz Astorga MD FACS  Assistant Professor of Surgery  Director, Living Kidney Donor Program.    ------------------------------------------------------------------------------------------    S: Mr. Flores donate a kidney 10 day ago and is doing well, reporting no fevers, chills, dysuria.  He is not taking narcotic analgesia.  He is not constipated.  He is mostly back to routine activities of daily living and is feeling ready to return to work at this time.    Medications:  Prescription Medications as of 3/28/2023       Rx Number Disp Refills Start End Last Dispensed Date Next Fill Date Owning Pharmacy    acetaminophen (TYLENOL) 325 MG tablet  60 tablet 0 3/17/2023    [facility] - [address]    Sig: Take 2 tablets (650 mg) by mouth every 6 hours as needed for pain    Class: E-Prescribe    Route: Oral    methocarbamol (ROBAXIN) 500 MG tablet  20 tablet 0 3/17/2023    [address]    Sig: Take 1 tablet (500 mg) by mouth 4 times daily  as needed for muscle spasms    Class: E-Prescribe    Route: Oral    oxyCODONE (ROXICODONE) 5 MG tablet  20 tablet 0 3/17/2023    [facility] - [address]    Sig: Take 1-2 tablets (5-10 mg) by mouth every 4 hours as needed for moderate to severe pain    Class: E-Prescribe    Earliest Fill Date: 3/17/2023    Route: Oral    senna-docusate (SENOKOT-S/PERICOLACE) 8.6-50 MG tablet  60 tablet 0 3/17/2023    [facility] - [address]    Sig: Take 1 tablet by mouth 2 times daily    Class: E-Prescribe    Route: Oral          Exam:      Appearance: in no apparent distress.   Skin: normal  Head and Neck: Normal, no rashes or jaundice  Respiratory: normal respiratory excursions, no audible wheeze  Cardiovascular: RRR  Abdomen: flat, No distention and incisions C/D/I   Extremeties: Edema, none  Neuro: without deficit       Labs:   Admission on 03/16/2023, Discharged on 03/17/2023   Component Date Value Ref Range Status     Hold Specimen 03/16/2023 Specimen Received   Final     Hold Specimen 03/16/2023 JIC   Final     Hold Specimen 03/16/2023 JI   Final     GLUCOSE BY METER POCT 03/16/2023 105 (H)  70 - 99 mg/dL Final     WBC Count 03/16/2023 13.9 (H)  4.0 - 11.0 10e3/uL Final     RBC Count 03/16/2023 6.37 (H)  4.40 - 5.90 10e6/uL Final     Hemoglobin 03/16/2023 13.1 (L)  13.3 - 17.7 g/dL Final     Hematocrit 03/16/2023 43.4  40.0 - 53.0 % Final     MCV 03/16/2023 68 (L)  78 - 100 fL Final     MCH 03/16/2023 20.6 (L)  26.5 - 33.0 pg Final     MCHC 03/16/2023 30.2 (L)  31.5 - 36.5 g/dL Final     RDW 03/16/2023 15.1 (H)  10.0 - 15.0 % Final     Platelet Count 03/16/2023 156  150 - 450 10e3/uL Final     CK 03/16/2023 105  39 - 308 U/L Final     Hemoglobin 03/17/2023 12.4 (L)  13.3 - 17.7 g/dL Final     Hematocrit 03/17/2023 41.0  40.0 - 53.0 % Final     Urea Nitrogen 03/17/2023 18.7  6.0 - 20.0 mg/dL Final     Creatinine 03/17/2023 1.78 (H)   0.67 - 1.17 mg/dL Final     GFR Estimate 03/17/2023 55 (L)  >60 mL/min/1.73m2 Final    eGFR calculated using 2021 CKD-EPI equation.     CK 03/17/2023 295  39 - 308 U/L Final     Color Urine 03/17/2023 Light Yellow  Colorless, Straw, Light Yellow, Yellow Final     Appearance Urine 03/17/2023 Clear  Clear Final     Glucose Urine 03/17/2023 Negative  Negative mg/dL Final     Bilirubin Urine 03/17/2023 Negative  Negative Final     Ketones Urine 03/17/2023 Negative  Negative mg/dL Final     Specific Gravity Urine 03/17/2023 1.016  1.003 - 1.035 Final     Blood Urine 03/17/2023 Negative  Negative Final     pH Urine 03/17/2023 6.0  5.0 - 7.0 Final     Protein Albumin Urine 03/17/2023 Negative  Negative mg/dL Final     Urobilinogen Urine 03/17/2023 Normal  Normal, 2.0 mg/dL Final     Nitrite Urine 03/17/2023 Negative  Negative Final     Leukocyte Esterase Urine 03/17/2023 Negative  Negative Final     Mucus Urine 03/17/2023 Present (A)  None Seen /LPF Final     RBC Urine 03/17/2023 <1  <=2 /HPF Final     WBC Urine 03/17/2023 2  <=5 /HPF Final

## 2023-04-10 ENCOUNTER — TELEPHONE (OUTPATIENT)
Dept: TRANSPLANT | Facility: CLINIC | Age: 23
End: 2023-04-10

## 2023-04-10 NOTE — TELEPHONE ENCOUNTER
Armando COMBS with questions. SW attempted to call back and left a message with contact info.     Shani Haines, [facility]othe[facility] CenterCONCHIS, CCTSW   Living Donor   [facility], [facility], [facility]  Direct: [phone]  E-Mail: [email or web address]

## 2023-07-31 ENCOUNTER — TELEPHONE (OUTPATIENT)
Dept: TRANSPLANT | Facility: CLINIC | Age: 23
End: 2023-07-31

## 2023-07-31 NOTE — TELEPHONE ENCOUNTER
Armando's sister Cherie had LM with recip coordinator re: brother's expenses.Now spoke with her. She remembers talking to Armando's coordinator Fabby about reimbursements for expenses such as gas,time off,etc. Would like to discuss with CONCHIS. Now LM with SW to contact. Cherie's phone # is [phone].

## 2023-07-31 NOTE — TELEPHONE ENCOUNTER
Received call from Armando's sister inquiring about his reimbursement from [facility] for travel. Reviewed what expenses R covers and offered some education. She was understanding.     Shani Haines [facility], CCTSW   Living Donor   [facility], [facility]  Direct: [phone]  E-Mail: [email or web address]

## 2024-03-09 ENCOUNTER — HEALTH MAINTENANCE LETTER (OUTPATIENT)
Age: 24
End: 2024-03-09

## 2024-08-01 DIAGNOSIS — Z52.4 KIDNEY DONOR: Primary | ICD-10-CM

## 2024-08-02 ENCOUNTER — DOCUMENTATION ONLY (OUTPATIENT)
Dept: TRANSPLANT | Facility: CLINIC | Age: 24
End: 2024-08-02

## 2024-08-02 NOTE — PROGRESS NOTES
Sent via Email and US Mail [employer] orders with instructions--asked they be done ASAP.Orders in Epic.

## 2025-03-01 ENCOUNTER — LAB (OUTPATIENT)
Dept: LAB | Facility: CLINIC | Age: 25
End: 2025-03-01

## 2025-03-01 DIAGNOSIS — Z52.4 KIDNEY DONOR: ICD-10-CM

## 2025-03-01 LAB
ALBUMIN MFR UR ELPH: 11.3 MG/DL
ALBUMIN UR-MCNC: NEGATIVE MG/DL
APPEARANCE UR: CLEAR
BACTERIA #/AREA URNS HPF: ABNORMAL /HPF
BILIRUB UR QL STRIP: NEGATIVE
COLOR UR AUTO: ABNORMAL
CREAT SERPL-MCNC: 1.26 MG/DL (ref 0.67–1.17)
CREAT UR-MCNC: 92 MG/DL
CREAT UR-MCNC: 92.3 MG/DL
EGFRCR SERPLBLD CKD-EPI 2021: 82 ML/MIN/1.73M2
GLUCOSE UR STRIP-MCNC: NEGATIVE MG/DL
HGB UR QL STRIP: NEGATIVE
KETONES UR STRIP-MCNC: NEGATIVE MG/DL
LEUKOCYTE ESTERASE UR QL STRIP: NEGATIVE
MICROALBUMIN UR-MCNC: <12 MG/L
MICROALBUMIN/CREAT UR: NORMAL MG/G{CREAT}
MUCOUS THREADS #/AREA URNS LPF: PRESENT /LPF
NITRATE UR QL: NEGATIVE
PH UR STRIP: 6.5 [PH] (ref 5–7)
PROT/CREAT 24H UR: 0.12 MG/MG CR (ref 0–0.2)
RBC URINE: <1 /HPF
SP GR UR STRIP: 1.02 (ref 1–1.03)
SQUAMOUS EPITHELIAL: <1 /HPF
UROBILINOGEN UR STRIP-MCNC: NORMAL MG/DL
WBC URINE: <1 /HPF

## 2025-03-01 PROCEDURE — 82565 ASSAY OF CREATININE: CPT | Performed by: PATHOLOGY

## 2025-03-01 PROCEDURE — 99000 SPECIMEN HANDLING OFFICE-LAB: CPT | Performed by: PATHOLOGY

## 2025-03-01 PROCEDURE — 36415 COLL VENOUS BLD VENIPUNCTURE: CPT | Performed by: PATHOLOGY

## 2025-03-01 PROCEDURE — 81001 URINALYSIS AUTO W/SCOPE: CPT | Performed by: PATHOLOGY

## 2025-03-01 PROCEDURE — 84156 ASSAY OF PROTEIN URINE: CPT | Performed by: PATHOLOGY

## 2025-03-01 PROCEDURE — 82043 UR ALBUMIN QUANTITATIVE: CPT | Performed by: SURGERY

## 2025-03-03 ENCOUNTER — DOCUMENTATION ONLY (OUTPATIENT)
Dept: TRANSPLANT | Facility: CLINIC | Age: 25
End: 2025-03-03

## 2025-03-16 ENCOUNTER — HEALTH MAINTENANCE LETTER (OUTPATIENT)
Age: 25
End: 2025-03-16

## (undated) DEVICE — CLIP APPLIER ENDO ROTATING 10MM MED/LG ER320

## (undated) DEVICE — TUBING SMOKE EVAC PNEUMOCLEAR HEATED 0620050350

## (undated) DEVICE — Device

## (undated) DEVICE — CATH TRAY FOLEY SURESTEP 16FR W/URNE MTR STLK LATEX A303316A

## (undated) DEVICE — DEVICE SUTURE PASSER 14GA WECK EFX EFXSP2

## (undated) DEVICE — ESU GROUND PAD ADULT W/CORD E7507

## (undated) DEVICE — BASIN SET SINGLE STERILE 13752-624

## (undated) DEVICE — SU VICRYL 0 TIE 54" J608H

## (undated) DEVICE — WIPES FOLEY CARE SURESTEP PROVON DFC100

## (undated) DEVICE — ENDO TROCAR FIRST ENTRY KII FIOS Z-THRD 12X100MM CTF73

## (undated) DEVICE — SU SILK 4-0 TIE 12X30" A303H

## (undated) DEVICE — SU SILK 2-0 TIE 12X30" A305H

## (undated) DEVICE — GOWN IMPERVIOUS BREATHABLE SMART LG 89015

## (undated) DEVICE — SOL NACL 0.9% IRRIG 1000ML BOTTLE 2F7124

## (undated) DEVICE — SHEARS HARMONIC ULTRASONIC LAP 36CM CURVE TIP HAR1136

## (undated) DEVICE — SOL WATER IRRIG 1000ML BOTTLE 2F7114

## (undated) DEVICE — ENDO TROCAR SLEEVE KII Z-THREADED 12X100MM CTS22

## (undated) DEVICE — SU MONOCRYL 4-0 PS-2 27" UND Y426H

## (undated) DEVICE — SUCTION MANIFOLD NEPTUNE 2 SYS 4 PORT 0702-020-000

## (undated) DEVICE — GLOVE BIOGEL PI ULTRATOUCH SZ 6.5 41165

## (undated) DEVICE — SOL ADH LIQUID BENZOIN SWAB 0.6ML C1544

## (undated) DEVICE — DRSG PRIMAPORE 02X3" 7133

## (undated) DEVICE — TUBING IRRIG CYSTO/BLADDER SET 81" LF 2C4040

## (undated) DEVICE — DRAPE ISOLATION BAG 1003

## (undated) DEVICE — ENDO GELPORT 100/120MM C8XX2

## (undated) DEVICE — SU VICRYL 3-0 SH 27" UND J416H

## (undated) DEVICE — ANTIFOG SOLUTION W/FOAM PAD 31142527

## (undated) DEVICE — DRSG STERI STRIP 1/2X4" R1547

## (undated) DEVICE — SU PROLENE 1 CTX 30" 8455H

## (undated) DEVICE — LINEN TOWEL PACK X6 WHITE 5487

## (undated) DEVICE — PREP CHLORAPREP 26ML TINTED HI-LITE ORANGE 930815

## (undated) DEVICE — ESU PENCIL SMOKE EVAC W/ROCKER SWITCH 0703-047-000

## (undated) DEVICE — ESU ENDO SCISSORS 5MM CVD 5DCS

## (undated) DEVICE — CANNULA VESSEL DLP  30001

## (undated) DEVICE — BLADE CLIPPER SGL USE 9680

## (undated) DEVICE — ENDO SCOPE WARMER SEAL  C3101

## (undated) DEVICE — DRAPE STERI FLUOROSCOPE 35X43"1012 LATEX FREE

## (undated) DEVICE — SU SILK 3-0 TIE 12X30" A304H

## (undated) DEVICE — ENDO CLOSING KIT ENDOCLOSE 173022

## (undated) DEVICE — DRSG PRIMAPORE 03 1/8X6" 66000318

## (undated) DEVICE — STRAP UNIVERSAL POSITIONING 2-PIECE 4X47X76" 91-287

## (undated) DEVICE — DRAPE FLUID WARMING 52 X 60" ORS-321

## (undated) DEVICE — LINEN TOWEL PACK X30 5481

## (undated) DEVICE — STPL POWERED ECHELON VASC 35MM PVE35A

## (undated) DEVICE — ANTIFOG SOLUTION W/FOAM PAD CF-1001

## (undated) DEVICE — DRAPE IOBAN INCISE 23X17" 6650EZ

## (undated) RX ORDER — CARDIOPLEG/ORGAN PRESERV NO.1 9-198-2-1
BOTTLE PERFUSION
Status: DISPENSED
Start: 2023-03-16

## (undated) RX ORDER — ONDANSETRON 2 MG/ML
INJECTION INTRAMUSCULAR; INTRAVENOUS
Status: DISPENSED
Start: 2023-03-16

## (undated) RX ORDER — HYDROMORPHONE HYDROCHLORIDE 1 MG/ML
INJECTION, SOLUTION INTRAMUSCULAR; INTRAVENOUS; SUBCUTANEOUS
Status: DISPENSED
Start: 2023-03-16

## (undated) RX ORDER — MANNITOL 20 G/100ML
INJECTION, SOLUTION INTRAVENOUS
Status: DISPENSED
Start: 2023-03-16

## (undated) RX ORDER — CEFUROXIME SODIUM 1.5 G/16ML
INJECTION, POWDER, FOR SOLUTION INTRAVENOUS
Status: DISPENSED
Start: 2023-03-16

## (undated) RX ORDER — FENTANYL CITRATE 50 UG/ML
INJECTION, SOLUTION INTRAMUSCULAR; INTRAVENOUS
Status: DISPENSED
Start: 2023-03-16

## (undated) RX ORDER — ACETAMINOPHEN 325 MG/1
TABLET ORAL
Status: DISPENSED
Start: 2023-03-16

## (undated) RX ORDER — HEPARIN SODIUM 1000 [USP'U]/ML
INJECTION, SOLUTION INTRAVENOUS; SUBCUTANEOUS
Status: DISPENSED
Start: 2023-03-16

## (undated) RX ORDER — FENTANYL CITRATE-0.9 % NACL/PF 10 MCG/ML
PLASTIC BAG, INJECTION (ML) INTRAVENOUS
Status: DISPENSED
Start: 2023-03-16

## (undated) RX ORDER — DEXTROSE, SODIUM CHLORIDE, SODIUM LACTATE, POTASSIUM CHLORIDE, AND CALCIUM CHLORIDE 5; .6; .31; .03; .02 G/100ML; G/100ML; G/100ML; G/100ML; G/100ML
INJECTION, SOLUTION INTRAVENOUS
Status: DISPENSED
Start: 2023-03-16

## (undated) RX ORDER — HEPARIN SODIUM 5000 [USP'U]/.5ML
INJECTION, SOLUTION INTRAVENOUS; SUBCUTANEOUS
Status: DISPENSED
Start: 2023-03-16

## (undated) RX ORDER — BUPIVACAINE HYDROCHLORIDE 2.5 MG/ML
INJECTION, SOLUTION EPIDURAL; INFILTRATION; INTRACAUDAL
Status: DISPENSED
Start: 2023-03-16

## (undated) RX ORDER — PROTAMINE SULFATE 10 MG/ML
INJECTION, SOLUTION INTRAVENOUS
Status: DISPENSED
Start: 2023-03-16

## (undated) RX ORDER — FUROSEMIDE 10 MG/ML
INJECTION INTRAMUSCULAR; INTRAVENOUS
Status: DISPENSED
Start: 2023-03-16

## (undated) RX ORDER — GABAPENTIN 300 MG/1
CAPSULE ORAL
Status: DISPENSED
Start: 2023-03-16

## (undated) RX ORDER — GLYCOPYRROLATE 0.2 MG/ML
INJECTION, SOLUTION INTRAMUSCULAR; INTRAVENOUS
Status: DISPENSED
Start: 2023-03-16